# Patient Record
Sex: FEMALE | Race: WHITE | HISPANIC OR LATINO | URBAN - METROPOLITAN AREA
[De-identification: names, ages, dates, MRNs, and addresses within clinical notes are randomized per-mention and may not be internally consistent; named-entity substitution may affect disease eponyms.]

---

## 2017-10-12 ENCOUNTER — OUTPATIENT (OUTPATIENT)
Dept: OUTPATIENT SERVICES | Facility: HOSPITAL | Age: 52
LOS: 1 days | End: 2017-10-12
Payer: COMMERCIAL

## 2017-10-12 ENCOUNTER — APPOINTMENT (OUTPATIENT)
Dept: MRI IMAGING | Facility: CLINIC | Age: 52
End: 2017-10-12
Payer: COMMERCIAL

## 2017-10-12 DIAGNOSIS — Z00.8 ENCOUNTER FOR OTHER GENERAL EXAMINATION: ICD-10-CM

## 2017-10-12 PROCEDURE — 71550 MRI CHEST W/O DYE: CPT

## 2017-10-12 PROCEDURE — 71550 MRI CHEST W/O DYE: CPT | Mod: 26

## 2017-10-25 ENCOUNTER — OUTPATIENT (OUTPATIENT)
Dept: OUTPATIENT SERVICES | Facility: HOSPITAL | Age: 52
LOS: 1 days | End: 2017-10-25
Payer: COMMERCIAL

## 2017-10-25 ENCOUNTER — APPOINTMENT (OUTPATIENT)
Dept: CV DIAGNOSITCS | Facility: HOSPITAL | Age: 52
End: 2017-10-25

## 2017-10-25 PROCEDURE — 93306 TTE W/DOPPLER COMPLETE: CPT | Mod: 26

## 2017-10-25 PROCEDURE — 93306 TTE W/DOPPLER COMPLETE: CPT

## 2017-10-26 ENCOUNTER — RESULT REVIEW (OUTPATIENT)
Age: 52
End: 2017-10-26

## 2017-10-26 DIAGNOSIS — I25.10 ATHEROSCLEROTIC HEART DISEASE OF NATIVE CORONARY ARTERY WITHOUT ANGINA PECTORIS: ICD-10-CM

## 2017-10-27 ENCOUNTER — TRANSCRIPTION ENCOUNTER (OUTPATIENT)
Age: 52
End: 2017-10-27

## 2017-11-08 ENCOUNTER — TRANSCRIPTION ENCOUNTER (OUTPATIENT)
Age: 52
End: 2017-11-08

## 2018-01-06 ENCOUNTER — TRANSCRIPTION ENCOUNTER (OUTPATIENT)
Age: 53
End: 2018-01-06

## 2018-01-25 ENCOUNTER — OUTPATIENT (OUTPATIENT)
Dept: OUTPATIENT SERVICES | Facility: HOSPITAL | Age: 53
LOS: 1 days | End: 2018-01-25
Payer: COMMERCIAL

## 2018-01-25 ENCOUNTER — APPOINTMENT (OUTPATIENT)
Dept: MAMMOGRAPHY | Facility: CLINIC | Age: 53
End: 2018-01-25
Payer: COMMERCIAL

## 2018-01-25 ENCOUNTER — APPOINTMENT (OUTPATIENT)
Dept: ULTRASOUND IMAGING | Facility: CLINIC | Age: 53
End: 2018-01-25
Payer: COMMERCIAL

## 2018-01-25 DIAGNOSIS — Z00.8 ENCOUNTER FOR OTHER GENERAL EXAMINATION: ICD-10-CM

## 2018-01-25 DIAGNOSIS — Z12.31 ENCOUNTER FOR SCREENING MAMMOGRAM FOR MALIGNANT NEOPLASM OF BREAST: ICD-10-CM

## 2018-01-25 DIAGNOSIS — N60.19 DIFFUSE CYSTIC MASTOPATHY OF UNSPECIFIED BREAST: ICD-10-CM

## 2018-01-25 PROCEDURE — 76641 ULTRASOUND BREAST COMPLETE: CPT | Mod: 26,50

## 2018-01-25 PROCEDURE — 77067 SCR MAMMO BI INCL CAD: CPT | Mod: 26

## 2018-01-25 PROCEDURE — 77067 SCR MAMMO BI INCL CAD: CPT

## 2018-01-25 PROCEDURE — 77063 BREAST TOMOSYNTHESIS BI: CPT

## 2018-01-25 PROCEDURE — 77063 BREAST TOMOSYNTHESIS BI: CPT | Mod: 26

## 2018-01-25 PROCEDURE — 76641 ULTRASOUND BREAST COMPLETE: CPT

## 2018-02-05 ENCOUNTER — NON-APPOINTMENT (OUTPATIENT)
Age: 53
End: 2018-02-05

## 2018-02-05 ENCOUNTER — APPOINTMENT (OUTPATIENT)
Dept: CARDIOLOGY | Facility: CLINIC | Age: 53
End: 2018-02-05
Payer: COMMERCIAL

## 2018-02-05 VITALS
DIASTOLIC BLOOD PRESSURE: 81 MMHG | HEART RATE: 72 BPM | SYSTOLIC BLOOD PRESSURE: 134 MMHG | OXYGEN SATURATION: 100 % | HEIGHT: 59 IN | BODY MASS INDEX: 27.42 KG/M2 | WEIGHT: 136 LBS

## 2018-02-05 DIAGNOSIS — Z82.49 FAMILY HISTORY OF ISCHEMIC HEART DISEASE AND OTHER DISEASES OF THE CIRCULATORY SYSTEM: ICD-10-CM

## 2018-02-05 PROCEDURE — 99204 OFFICE O/P NEW MOD 45 MIN: CPT

## 2018-02-05 PROCEDURE — 93000 ELECTROCARDIOGRAM COMPLETE: CPT

## 2018-02-05 RX ORDER — FENOFIBRATE,MICRONIZED 160 MG
160 TABLET ORAL DAILY
Refills: 0 | Status: ACTIVE | COMMUNITY

## 2018-02-05 RX ORDER — AZELASTINE HYDROCHLORIDE 137 UG/1
137 SPRAY, METERED NASAL TWICE DAILY
Refills: 0 | Status: ACTIVE | COMMUNITY

## 2018-02-05 RX ORDER — MOMETASONE FUROATE MONOHYDRATE 50 UG/1
50 SPRAY, METERED NASAL TWICE DAILY
Refills: 0 | Status: ACTIVE | COMMUNITY

## 2018-02-23 ENCOUNTER — TRANSCRIPTION ENCOUNTER (OUTPATIENT)
Age: 53
End: 2018-02-23

## 2018-10-12 ENCOUNTER — APPOINTMENT (OUTPATIENT)
Dept: ORTHOPEDIC SURGERY | Facility: CLINIC | Age: 53
End: 2018-10-12
Payer: COMMERCIAL

## 2018-10-12 VITALS — HEIGHT: 59 IN | SYSTOLIC BLOOD PRESSURE: 138 MMHG | DIASTOLIC BLOOD PRESSURE: 79 MMHG | HEART RATE: 80 BPM

## 2018-10-12 DIAGNOSIS — Z86.39 PERSONAL HISTORY OF OTHER ENDOCRINE, NUTRITIONAL AND METABOLIC DISEASE: ICD-10-CM

## 2018-10-12 DIAGNOSIS — Z78.9 OTHER SPECIFIED HEALTH STATUS: ICD-10-CM

## 2018-10-12 PROCEDURE — 99203 OFFICE O/P NEW LOW 30 MIN: CPT

## 2018-10-12 RX ORDER — OMEPRAZOLE 20 MG/1
TABLET, DELAYED RELEASE ORAL
Refills: 0 | Status: ACTIVE | COMMUNITY

## 2018-11-20 ENCOUNTER — TRANSCRIPTION ENCOUNTER (OUTPATIENT)
Age: 53
End: 2018-11-20

## 2018-12-06 ENCOUNTER — RESULT REVIEW (OUTPATIENT)
Age: 53
End: 2018-12-06

## 2019-01-31 ENCOUNTER — APPOINTMENT (OUTPATIENT)
Dept: ULTRASOUND IMAGING | Facility: CLINIC | Age: 54
End: 2019-01-31
Payer: COMMERCIAL

## 2019-01-31 ENCOUNTER — APPOINTMENT (OUTPATIENT)
Dept: MAMMOGRAPHY | Facility: CLINIC | Age: 54
End: 2019-01-31
Payer: COMMERCIAL

## 2019-01-31 ENCOUNTER — OUTPATIENT (OUTPATIENT)
Dept: OUTPATIENT SERVICES | Facility: HOSPITAL | Age: 54
LOS: 1 days | End: 2019-01-31
Payer: COMMERCIAL

## 2019-01-31 DIAGNOSIS — E83.59 OTHER DISORDERS OF CALCIUM METABOLISM: ICD-10-CM

## 2019-01-31 DIAGNOSIS — Z00.8 ENCOUNTER FOR OTHER GENERAL EXAMINATION: ICD-10-CM

## 2019-01-31 DIAGNOSIS — N60.19 DIFFUSE CYSTIC MASTOPATHY OF UNSPECIFIED BREAST: ICD-10-CM

## 2019-01-31 DIAGNOSIS — Z12.31 ENCOUNTER FOR SCREENING MAMMOGRAM FOR MALIGNANT NEOPLASM OF BREAST: ICD-10-CM

## 2019-01-31 PROCEDURE — 76775 US EXAM ABDO BACK WALL LIM: CPT | Mod: 26

## 2019-01-31 PROCEDURE — 76641 ULTRASOUND BREAST COMPLETE: CPT | Mod: 26,50

## 2019-01-31 PROCEDURE — 77067 SCR MAMMO BI INCL CAD: CPT | Mod: 26

## 2019-01-31 PROCEDURE — 77063 BREAST TOMOSYNTHESIS BI: CPT | Mod: 26

## 2019-01-31 PROCEDURE — 76641 ULTRASOUND BREAST COMPLETE: CPT

## 2019-01-31 PROCEDURE — 77067 SCR MAMMO BI INCL CAD: CPT

## 2019-01-31 PROCEDURE — 76775 US EXAM ABDO BACK WALL LIM: CPT

## 2019-01-31 PROCEDURE — 77063 BREAST TOMOSYNTHESIS BI: CPT

## 2019-02-01 ENCOUNTER — APPOINTMENT (OUTPATIENT)
Dept: ULTRASOUND IMAGING | Facility: CLINIC | Age: 54
End: 2019-02-01
Payer: COMMERCIAL

## 2019-02-01 ENCOUNTER — OUTPATIENT (OUTPATIENT)
Dept: OUTPATIENT SERVICES | Facility: HOSPITAL | Age: 54
LOS: 1 days | End: 2019-02-01
Payer: COMMERCIAL

## 2019-02-01 DIAGNOSIS — R92.2 INCONCLUSIVE MAMMOGRAM: ICD-10-CM

## 2019-02-01 PROCEDURE — 76642 ULTRASOUND BREAST LIMITED: CPT

## 2019-02-01 PROCEDURE — 76642 ULTRASOUND BREAST LIMITED: CPT | Mod: 26,LT

## 2019-02-05 ENCOUNTER — APPOINTMENT (OUTPATIENT)
Dept: ULTRASOUND IMAGING | Facility: CLINIC | Age: 54
End: 2019-02-05

## 2019-02-11 ENCOUNTER — APPOINTMENT (OUTPATIENT)
Dept: ULTRASOUND IMAGING | Facility: CLINIC | Age: 54
End: 2019-02-11

## 2019-02-14 ENCOUNTER — APPOINTMENT (OUTPATIENT)
Dept: MRI IMAGING | Facility: CLINIC | Age: 54
End: 2019-02-14
Payer: COMMERCIAL

## 2019-02-14 ENCOUNTER — OUTPATIENT (OUTPATIENT)
Dept: OUTPATIENT SERVICES | Facility: HOSPITAL | Age: 54
LOS: 1 days | End: 2019-02-14
Payer: COMMERCIAL

## 2019-02-14 DIAGNOSIS — Z00.8 ENCOUNTER FOR OTHER GENERAL EXAMINATION: ICD-10-CM

## 2019-02-14 DIAGNOSIS — R47.9 UNSPECIFIED SPEECH DISTURBANCES: ICD-10-CM

## 2019-02-14 PROCEDURE — 70551 MRI BRAIN STEM W/O DYE: CPT | Mod: 26

## 2019-02-14 PROCEDURE — 70551 MRI BRAIN STEM W/O DYE: CPT

## 2019-05-29 ENCOUNTER — RESULT REVIEW (OUTPATIENT)
Age: 54
End: 2019-05-29

## 2019-09-12 ENCOUNTER — APPOINTMENT (OUTPATIENT)
Dept: ORTHOPEDIC SURGERY | Facility: CLINIC | Age: 54
End: 2019-09-12
Payer: OTHER MISCELLANEOUS

## 2019-09-12 VITALS — BODY MASS INDEX: 27.42 KG/M2 | WEIGHT: 136 LBS | HEIGHT: 59 IN

## 2019-09-12 PROCEDURE — 99203 OFFICE O/P NEW LOW 30 MIN: CPT

## 2019-09-12 PROCEDURE — 73564 X-RAY EXAM KNEE 4 OR MORE: CPT | Mod: RT

## 2019-09-12 NOTE — HISTORY OF PRESENT ILLNESS
[de-identified] : Worker’s Compensation Visit:\par 54 year old female presents for an evaluation of right knee pain hat began on 8/31/2019 when the patient was injured at work while she was hit with a chair on her right knee, noting immediate swelling about the right knee. The swelling has since subsided. She has been utilizing a knee sleeve for added support and stability. Today the patient describes an aching pain located along the anterior and medial aspects of her right knee that is intermittent in nature, as well as pain to palpation medially. Her symptoms are exacerbated with extended periods of walking and weight bearing. The patient has no other complaints at this time.\par \par Test or Referrals: Authorization for physical therapy of the right knee.\par Work Status: The patient is working full-duty, 0% disabled.\par Prognosis: The patient's prognosis for recovery is good.

## 2019-09-12 NOTE — DISCUSSION/SUMMARY
[de-identified] : The underlying pathophysiology was reviewed in great detail with the patient as well as the various treatment options, including ice, analgesics, NSAIDs, Physical therapy, steroid injections. \par \par A prescription for Physical Therapy was provided. A request was sent to workman's compensation. \par \par A home exercise sheet was given and discussed with the patient to follow. \par \par FU 6 weeks.

## 2019-09-12 NOTE — ADDENDUM
[FreeTextEntry1] : I, Inessa Puri, acted solely as a scribe for Dr. Jourdan Nichols on this date 09/12/2019.

## 2019-09-12 NOTE — END OF VISIT
[FreeTextEntry3] : All medical record entries made by the [facility] were at my, Dr. Jourdan Nichols, direction and personally dictated by me on 09/12/2019. I have reviewed the chart and agree that the record accurately reflects my personal performance of the history, physical exam, assessment and plan. I have also personally directed, reviewed, and agreed with the chart.

## 2019-09-12 NOTE — PHYSICAL EXAM
[Normal RLE] : Right Lower Extremity: No scars, rashes, lesions, ulcers, skin intact [Normal LLE] : Left Lower Extremity: No scars, rashes, lesions, ulcers, skin intact [Normal Touch] : sensation intact for touch [Normal] : No swelling, no edema, normal pedal pulses and normal temperature [Poor Appearance] : well-appearing [Acute Distress] : not in acute distress [Obese] : not obese [de-identified] : Right Lower Extremity\par o Knee :\par ¦ Inspection/Palpation : moderate diffuse medial tenderness, no lateral tenderness, no swelling, no deformity\par ¦ Range of Motion : 0 - 110 degrees, no crepitus\par ¦ Stability : no valgus or varus instability present on provocative testing, Lachman’s Test (-)\par ¦ Strength : flexion and extension 4/5\par o Muscle Bulk : normal muscle bulk present\par o Skin : no erythema, no ecchymosis\par o Sensation : sensation to pin intact\par o Vascular Exam : no edema, no cyanosis, dorsalis pedis artery pulse 2+, posterior tibial artery pulse 2+ \par \par Left Lower Extremity\par o Knee :\par ¦ Inspection/Palpation : no tenderness to palpation, no swelling, no deformity\par ¦ Range of Motion : 0 -125 degrees, no crepitus\par ¦ Stability : no valgus or varus instability present on provocative testing, Lachman’s Test (-)\par ¦ Strength : flexion and extension 5/5\par o Muscle Bulk : normal muscle bulk present\par o Skin : no erythema, no ecchymosis\par o Sensation : sensation to pin intact\par o Vascular Exam : no edema, no cyanosis, dorsalis pedis artery pulse 2+, posterior tibial artery pulse 2+  [de-identified] : \par o Right Knee : AP, lateral, Burroughs and sunrise views of the knee were obtained, there are no soft tissue abnormalities, alignment is normal, no fractures, normal appearing joint spaces, normal bone density, no bony lesions\par \par

## 2019-09-12 NOTE — REASON FOR VISIT
[Initial Visit] : an initial visit for [Worker's Compensation] : This visit is related to worker's compensation [Knee Pain] : knee pain

## 2019-09-20 ENCOUNTER — RX RENEWAL (OUTPATIENT)
Age: 54
End: 2019-09-20

## 2019-10-12 ENCOUNTER — OUTPATIENT (OUTPATIENT)
Dept: OUTPATIENT SERVICES | Facility: HOSPITAL | Age: 54
LOS: 1 days | End: 2019-10-12
Payer: COMMERCIAL

## 2019-10-12 ENCOUNTER — APPOINTMENT (OUTPATIENT)
Dept: RADIOLOGY | Facility: HOSPITAL | Age: 54
End: 2019-10-12
Payer: COMMERCIAL

## 2019-10-12 DIAGNOSIS — Z00.8 ENCOUNTER FOR OTHER GENERAL EXAMINATION: ICD-10-CM

## 2019-10-12 PROCEDURE — 71046 X-RAY EXAM CHEST 2 VIEWS: CPT

## 2019-10-12 PROCEDURE — 71046 X-RAY EXAM CHEST 2 VIEWS: CPT | Mod: 26

## 2019-10-14 ENCOUNTER — OUTPATIENT (OUTPATIENT)
Dept: OUTPATIENT SERVICES | Facility: HOSPITAL | Age: 54
LOS: 1 days | End: 2019-10-14
Payer: COMMERCIAL

## 2019-10-14 DIAGNOSIS — Z00.00 ENCOUNTER FOR GENERAL ADULT MEDICAL EXAMINATION WITHOUT ABNORMAL FINDINGS: ICD-10-CM

## 2019-10-14 PROCEDURE — 93010 ELECTROCARDIOGRAM REPORT: CPT

## 2019-10-14 PROCEDURE — 93005 ELECTROCARDIOGRAM TRACING: CPT

## 2019-10-24 ENCOUNTER — APPOINTMENT (OUTPATIENT)
Dept: ORTHOPEDIC SURGERY | Facility: CLINIC | Age: 54
End: 2019-10-24
Payer: OTHER MISCELLANEOUS

## 2019-10-24 VITALS — HEIGHT: 59 IN | BODY MASS INDEX: 27.42 KG/M2 | WEIGHT: 136 LBS

## 2019-10-24 PROCEDURE — 99214 OFFICE O/P EST MOD 30 MIN: CPT

## 2019-10-24 NOTE — END OF VISIT
[FreeTextEntry3] : All medical record entries made by the Scribe were at my, Dr. Jourdan Nichols, direction and personally dictated by me on 10/24/2019. I have reviewed the chart and agree that the record accurately reflects my personal performance of the history, physical exam, assessment and plan. I have also personally directed, reviewed, and agreed with the chart.

## 2019-10-24 NOTE — HISTORY OF PRESENT ILLNESS
[de-identified] : Worker’s Compensation Visit:\par 54 year old female presents for an evaluation of right knee pain hat began on 8/31/2019 when the patient was injured at work while she was hit with a chair on her right knee, noting immediate swelling about the right knee. The swelling has since subsided. She has been utilizing a knee sleeve for added support and stability. Today the patient describes an aching pain located along the anterior and medial aspects of her right knee that is intermittent in nature, as well as pain to palpation medially. Her symptoms are exacerbated with extended periods of walking and weight bearing.\par \par Since her last visit: The patient has not been attending physical therapy but has been performing a home exercise program, noting improvements in strength and ROM. She reports that her pain has improved since her last visit, but she continues to experience moderate pain about her right knee, as well as occasional soreness along the medial aspect of her right knee and upper calf. Her symptoms are exacerbated with extended periods of walking, twisting motions, and with deep bending. \par \par Test or Referrals: Authorization for an MRI of the right knee. \par Work Status: The patient is working full-duty, 0% disabled.\par Prognosis: The patient's prognosis for recovery is good.

## 2019-10-24 NOTE — ADDENDUM
[FreeTextEntry1] : I, Inessa Puri, acted solely as a scribe for Dr. Jourdan Nichols on this date 10/24/2019.

## 2019-10-24 NOTE — DISCUSSION/SUMMARY
[de-identified] : The underlying pathophysiology was reviewed in great detail with the patient as well as the various treatment options, including ice, analgesics, NSAIDs, Physical therapy, steroid injections. \par \par An MRI of the right knee was ordered to rule out meniscal tear. A request was sent to workman's compensation. \par \par FU after imaging is obtained.

## 2019-10-24 NOTE — PHYSICAL EXAM
[Normal RLE] : Right Lower Extremity: No scars, rashes, lesions, ulcers, skin intact [Normal LLE] : Left Lower Extremity: No scars, rashes, lesions, ulcers, skin intact [Normal Touch] : sensation intact for touch [Normal] : No swelling, no edema, normal pedal pulses and normal temperature [Poor Appearance] : well-appearing [Acute Distress] : not in acute distress [Obese] : not obese [de-identified] : Right Lower Extremity\par o Knee :\par ¦ Inspection/Palpation : medial joint line tenderness, tenderness over the medial tibial plateau, no swelling, no deformity\par ¦ Range of Motion : 0 - 130 degrees, no crepitus\par ¦ Stability : no valgus or varus instability present on provocative testing, Lachman’s Test (-)\par ¦ Strength : flexion and extension 5/5\par ¦ Tests and Signs: Paul’s Test (+) with medial pain\par o Muscle Bulk : normal muscle bulk present\par o Skin : no erythema, no ecchymosis\par o Sensation : sensation to pin intact\par o Vascular Exam : no edema, no cyanosis, dorsalis pedis artery pulse 2+, posterior tibial artery pulse 2+ \par \par Left Lower Extremity\par o Knee :\par ¦ Inspection/Palpation : no tenderness to palpation, no swelling, no deformity\par ¦ Range of Motion : 0 -125 degrees, no crepitus\par ¦ Stability : no valgus or varus instability present on provocative testing, Lachman’s Test (-)\par ¦ Strength : flexion and extension 5/5\par o Muscle Bulk : normal muscle bulk present\par o Skin : no erythema, no ecchymosis\par o Sensation : sensation to pin intact\par o Vascular Exam : no edema, no cyanosis, dorsalis pedis artery pulse 2+, posterior tibial artery pulse 2+

## 2019-12-02 ENCOUNTER — FORM ENCOUNTER (OUTPATIENT)
Age: 54
End: 2019-12-02

## 2019-12-03 ENCOUNTER — OUTPATIENT (OUTPATIENT)
Dept: OUTPATIENT SERVICES | Facility: HOSPITAL | Age: 54
LOS: 1 days | End: 2019-12-03
Payer: COMMERCIAL

## 2019-12-03 ENCOUNTER — APPOINTMENT (OUTPATIENT)
Dept: MRI IMAGING | Facility: HOSPITAL | Age: 54
End: 2019-12-03
Payer: OTHER MISCELLANEOUS

## 2019-12-03 DIAGNOSIS — Z00.8 ENCOUNTER FOR OTHER GENERAL EXAMINATION: ICD-10-CM

## 2019-12-03 PROCEDURE — 73721 MRI JNT OF LWR EXTRE W/O DYE: CPT | Mod: 26,RT

## 2019-12-03 PROCEDURE — 73721 MRI JNT OF LWR EXTRE W/O DYE: CPT

## 2019-12-04 ENCOUNTER — CLINICAL ADVICE (OUTPATIENT)
Age: 54
End: 2019-12-04

## 2019-12-05 ENCOUNTER — APPOINTMENT (OUTPATIENT)
Dept: ORTHOPEDIC SURGERY | Facility: CLINIC | Age: 54
End: 2019-12-05
Payer: OTHER MISCELLANEOUS

## 2019-12-05 VITALS — WEIGHT: 136 LBS | HEIGHT: 59 IN | BODY MASS INDEX: 27.42 KG/M2

## 2019-12-05 PROCEDURE — 99214 OFFICE O/P EST MOD 30 MIN: CPT

## 2019-12-05 NOTE — ADDENDUM
[FreeTextEntry1] : I, Inessa Puri, acted solely as a scribe for Dr. Jourdan Nichols on this date 12/05/2019.

## 2019-12-05 NOTE — DISCUSSION/SUMMARY
[de-identified] : The underlying pathophysiology was reviewed in great detail with the patient as well as the various treatment options, including ice, analgesics, NSAIDs, Physical therapy, steroid injections, right arthroscopic medial meniscectomy. \par \par Prescriptions were provided for Physical Therapy and for Diclofenac gel. A request was sent to workman's compensation. \par \par FU 6 weeks.

## 2019-12-05 NOTE — END OF VISIT
[FreeTextEntry3] : All medical record entries made by the [facility] were at my, Dr. Jourdan Nichols, direction and personally dictated by me on 12/05/2019. I have reviewed the chart and agree that the record accurately reflects my personal performance of the history, physical exam, assessment and plan. I have also personally directed, reviewed, and agreed with the chart.

## 2019-12-05 NOTE — HISTORY OF PRESENT ILLNESS
[de-identified] : Worker’s Compensation Visit:\par 54 year old female presents for an evaluation of right knee pain that began on 8/31/2019 when the patient was injured at work while she was hit with a chair on her right knee, noting immediate swelling about the right knee. The swelling has since subsided. She has been utilizing a knee sleeve for added support and stability. Today the patient describes an aching pain located along the anterior and medial aspects of her right knee that is intermittent in nature, as well as pain to palpation medially. Her symptoms are exacerbated with extended periods of walking and weight bearing.\par \par Since her last visit: The patient reports that her pain has worsened since her last visit, and she has been experiencing an aching pain along the medial aspect of her right knee. Her symptoms are exacerbated with walking, twisting motions, climbing stairs, and with lying on her right side. She notes that her pain is also worse in the morning. Today she presents for an MRI review of her right knee. \par \par Test or Referrals: Authorization for physical therapy of the right knee. \par Work Status: The patient is working full-duty, 0% disabled.\par Prognosis: The patient's prognosis for recovery is good.

## 2019-12-05 NOTE — PHYSICAL EXAM
[Normal RLE] : Right Lower Extremity: No scars, rashes, lesions, ulcers, skin intact [Normal Touch] : sensation intact for touch [Normal] : No swelling, no edema, normal pedal pulses and normal temperature [Poor Appearance] : well-appearing [Acute Distress] : not in acute distress [Obese] : not obese [de-identified] : Right Lower Extremity\par o Knee :\par ¦ Inspection/Palpation : tenderness over the posteromedial joint line, no swelling, no deformity\par ¦ Range of Motion : 0 - 125 degrees, no crepitus\par ¦ Stability : no valgus or varus instability present on provocative testing, Lachman’s Test (-)\par ¦ Strength : flexion and extension 5/5\par ¦ Tests and Signs: Paul’s Test (+) with marked medial pain\par o Muscle Bulk : normal muscle bulk present\par o Skin : no erythema, no ecchymosis\par o Sensation : sensation to pin intact\par o Vascular Exam : no edema, no cyanosis, dorsalis pedis artery pulse 2+, posterior tibial artery pulse 2+  [de-identified] : o An MRI of the right knee was obtained at [facility] at [place] on 12/3/2019, revealed:\par 1. Non-displaced medial meniscal tear. \par 2. Low-grade focal chondral loss of the medial femoral condyle.

## 2019-12-08 ENCOUNTER — RX RENEWAL (OUTPATIENT)
Age: 54
End: 2019-12-08

## 2019-12-12 ENCOUNTER — RESULT REVIEW (OUTPATIENT)
Age: 54
End: 2019-12-12

## 2020-01-30 ENCOUNTER — APPOINTMENT (OUTPATIENT)
Dept: ORTHOPEDIC SURGERY | Facility: CLINIC | Age: 55
End: 2020-01-30
Payer: OTHER MISCELLANEOUS

## 2020-01-30 VITALS — HEIGHT: 59 IN | BODY MASS INDEX: 27.42 KG/M2 | WEIGHT: 136 LBS

## 2020-01-30 DIAGNOSIS — S89.91XD UNSPECIFIED INJURY OF RIGHT LOWER LEG, SUBSEQUENT ENCOUNTER: ICD-10-CM

## 2020-01-30 PROCEDURE — 99213 OFFICE O/P EST LOW 20 MIN: CPT

## 2020-01-30 NOTE — END OF VISIT
[FreeTextEntry3] : All medical record entries made by the [facility] were at my, Dr. Jourdan Nichols, direction and personally dictated by me on 01/30/2020. I have reviewed the chart and agree that the record accurately reflects my personal performance of the history, physical exam, assessment and plan. I have also personally directed, reviewed, and agreed with the chart.

## 2020-01-30 NOTE — ADDENDUM
[FreeTextEntry1] : I, Inessa Puri, acted solely as a scribe for Dr. Jourdan Nichols on this date 01/30/2020.

## 2020-01-30 NOTE — DISCUSSION/SUMMARY
[de-identified] : The underlying pathophysiology was reviewed in great detail with the patient as well as the various treatment options, including ice, analgesics, NSAIDs, Physical therapy, steroid injections, right arthroscopic medial meniscectomy. \par \par She is to continue with Physical Therapy. A request was sent to workman's compensation. \par \par FU 6 weeks.

## 2020-01-30 NOTE — HISTORY OF PRESENT ILLNESS
[de-identified] : Worker’s Compensation Visit:\par 54 year old female presents for an evaluation of right knee pain that began on 8/31/2019 when the patient was injured at work while she was hit with a chair on her right knee, noting immediate swelling about the right knee. The swelling has since subsided. She has been utilizing a knee sleeve for added support and stability. Today the patient describes an aching pain located along the anterior and medial aspects of her right knee that is intermittent in nature, as well as pain to palpation medially. Her symptoms are exacerbated with extended periods of walking and weight bearing.\par On 12/3/2019, an MRI of the right knee was obtained which revealed non-displaced medial meniscal tear, as well as low-grade focal chondral loss of the medial femoral condyle.\par \par Since her last visit: The patient has been attending physical therapy and notes improvements in strength and ROM. She reports that her pain has improved since her last visit and she describes moderate aching pain along the medial aspect of her right knee that is intermittent in nature. Her symptoms are exacerbated with certain twisting/pivoting motions, kneeling, and with deep knee flexion, though she is able to walk painlessly. The patient has no other complaints at this time. \par \par Test or Referrals: Authorization for physical therapy of the right knee. \par Work Status: The patient is working full-duty, 0% disabled.\par Prognosis: The patient's prognosis for recovery is good.

## 2020-01-30 NOTE — PHYSICAL EXAM
[Normal RLE] : Right Lower Extremity: No scars, rashes, lesions, ulcers, skin intact [Normal] : No swelling, no edema, normal pedal pulses and normal temperature [Normal Touch] : sensation intact for touch [Poor Appearance] : well-appearing [Acute Distress] : not in acute distress [Obese] : not obese [de-identified] : Right Lower Extremity\par o Knee :\par ¦ Inspection/Palpation : medial joint line tenderness, no swelling, no deformity\par ¦ Range of Motion : 0 - 125 degrees, no crepitus\par ¦ Stability : no valgus or varus instability present on provocative testing, Lachman’s Test (-)\par ¦ Strength : flexion and extension 5/5\par ¦ Tests and Signs: Paul’s Test (-) \par o Muscle Bulk : normal muscle bulk present\par o Skin : no erythema, no ecchymosis\par o Sensation : sensation to pin intact\par o Vascular Exam : no edema, no cyanosis, dorsalis pedis artery pulse 2+, posterior tibial artery pulse 2+

## 2020-02-03 ENCOUNTER — OUTPATIENT (OUTPATIENT)
Dept: OUTPATIENT SERVICES | Facility: HOSPITAL | Age: 55
LOS: 1 days | End: 2020-02-03
Payer: COMMERCIAL

## 2020-02-03 ENCOUNTER — APPOINTMENT (OUTPATIENT)
Dept: RADIOLOGY | Facility: HOSPITAL | Age: 55
End: 2020-02-03
Payer: COMMERCIAL

## 2020-02-03 DIAGNOSIS — Z00.8 ENCOUNTER FOR OTHER GENERAL EXAMINATION: ICD-10-CM

## 2020-02-03 PROCEDURE — 73030 X-RAY EXAM OF SHOULDER: CPT | Mod: 26,RT

## 2020-02-03 PROCEDURE — 73030 X-RAY EXAM OF SHOULDER: CPT

## 2020-02-06 ENCOUNTER — APPOINTMENT (OUTPATIENT)
Dept: MAMMOGRAPHY | Facility: CLINIC | Age: 55
End: 2020-02-06
Payer: COMMERCIAL

## 2020-02-06 ENCOUNTER — OUTPATIENT (OUTPATIENT)
Dept: OUTPATIENT SERVICES | Facility: HOSPITAL | Age: 55
LOS: 1 days | End: 2020-02-06
Payer: COMMERCIAL

## 2020-02-06 ENCOUNTER — APPOINTMENT (OUTPATIENT)
Dept: RADIOLOGY | Facility: CLINIC | Age: 55
End: 2020-02-06
Payer: COMMERCIAL

## 2020-02-06 ENCOUNTER — APPOINTMENT (OUTPATIENT)
Dept: ULTRASOUND IMAGING | Facility: CLINIC | Age: 55
End: 2020-02-06
Payer: COMMERCIAL

## 2020-02-06 DIAGNOSIS — Z00.8 ENCOUNTER FOR OTHER GENERAL EXAMINATION: ICD-10-CM

## 2020-02-06 PROCEDURE — 77063 BREAST TOMOSYNTHESIS BI: CPT

## 2020-02-06 PROCEDURE — 77063 BREAST TOMOSYNTHESIS BI: CPT | Mod: 26

## 2020-02-06 PROCEDURE — 76641 ULTRASOUND BREAST COMPLETE: CPT | Mod: 26,50

## 2020-02-06 PROCEDURE — 77080 DXA BONE DENSITY AXIAL: CPT | Mod: 26

## 2020-02-06 PROCEDURE — 77080 DXA BONE DENSITY AXIAL: CPT

## 2020-02-06 PROCEDURE — 77067 SCR MAMMO BI INCL CAD: CPT

## 2020-02-06 PROCEDURE — 77067 SCR MAMMO BI INCL CAD: CPT | Mod: 26

## 2020-02-06 PROCEDURE — 76641 ULTRASOUND BREAST COMPLETE: CPT

## 2020-03-12 ENCOUNTER — APPOINTMENT (OUTPATIENT)
Dept: ORTHOPEDIC SURGERY | Facility: CLINIC | Age: 55
End: 2020-03-12
Payer: OTHER MISCELLANEOUS

## 2020-03-12 VITALS — WEIGHT: 136 LBS | HEIGHT: 59 IN | BODY MASS INDEX: 27.42 KG/M2

## 2020-03-12 PROCEDURE — 99213 OFFICE O/P EST LOW 20 MIN: CPT

## 2020-03-12 NOTE — ADDENDUM
[FreeTextEntry1] : I, Inessa Puri, acted solely as a scribe for Dr. Jourdan Nichols on this date 03/12/2020.

## 2020-03-12 NOTE — HISTORY OF PRESENT ILLNESS
[de-identified] : Worker’s Compensation Visit:\par 54 year old female presents for an evaluation of right knee pain that began on 8/31/2019 when the patient was injured at work while she was hit with a chair on her right knee, noting immediate swelling about the right knee. The swelling has since subsided. She has been utilizing a knee sleeve for added support and stability. Today the patient describes an aching pain located along the anterior and medial aspects of her right knee that is intermittent in nature, as well as pain to palpation medially. Her symptoms are exacerbated with extended periods of walking and weight bearing.\par On 12/3/2019, an MRI of the right knee was obtained which revealed non-displaced medial meniscal tear, as well as low-grade focal chondral loss of the medial femoral condyle.\par \par Since her last visit: The patient has been attending physical therapy and notes improvements in strength and ROM. She reports that her pain has continued to improve since her last visit and she describes only mild pain along the medial aspect of her right knee that is intermittent in nature. Her symptoms are exacerbated with certain twisting/pivoting motions. The patient has no other complaints at this time. \par \par Test or Referrals: Authorization for physical therapy of the right knee. \par Work Status: The patient is working full-duty, 0% disabled.\par Prognosis: The patient's prognosis for recovery is good.

## 2020-03-12 NOTE — DISCUSSION/SUMMARY
[de-identified] : The underlying pathophysiology was reviewed in great detail with the patient as well as the various treatment options, including ice, analgesics, NSAIDs, Physical therapy, steroid injections, right arthroscopic medial meniscectomy. \par \par She is to continue with Physical Therapy, a prescription was provided. A request was sent to workman's compensation. \par \par FU 6 weeks.

## 2020-03-12 NOTE — PHYSICAL EXAM
[Normal RLE] : Right Lower Extremity: No scars, rashes, lesions, ulcers, skin intact [Normal Touch] : sensation intact for touch [Normal] : No swelling, no edema, normal pedal pulses and normal temperature [Poor Appearance] : well-appearing [Acute Distress] : not in acute distress [Obese] : not obese [de-identified] : Right Lower Extremity\par o Knee :\par ¦ Inspection/Palpation : tenderness over the medial epicondyle, no medial joint line tenderness, no swelling, no deformity\par ¦ Range of Motion : 0 - 130 degrees, no crepitus\par ¦ Stability : no valgus or varus instability present on provocative testing, Lachman’s Test (-)\par ¦ Strength : flexion and extension 5/5\par ¦ Tests and Signs: Paul’s Test (-) \par o Muscle Bulk : normal muscle bulk present\par o Skin : no erythema, no ecchymosis\par o Sensation : sensation to pin intact\par o Vascular Exam : no edema, no cyanosis, dorsalis pedis artery pulse 2+, posterior tibial artery pulse 2+

## 2020-03-12 NOTE — END OF VISIT
[FreeTextEntry3] : All medical record entries made by the [facility] were at my, Dr. Jourdan Nichols, direction and personally dictated by me on 03/12/2020. I have reviewed the chart and agree that the record accurately reflects my personal performance of the history, physical exam, assessment and plan. I have also personally directed, reviewed, and agreed with the chart.

## 2020-04-23 ENCOUNTER — APPOINTMENT (OUTPATIENT)
Dept: ORTHOPEDIC SURGERY | Facility: CLINIC | Age: 55
End: 2020-04-23

## 2020-04-24 ENCOUNTER — APPOINTMENT (OUTPATIENT)
Dept: ORTHOPEDIC SURGERY | Facility: CLINIC | Age: 55
End: 2020-04-24
Payer: OTHER MISCELLANEOUS

## 2020-04-24 PROCEDURE — 99213 OFFICE O/P EST LOW 20 MIN: CPT | Mod: 95

## 2020-04-24 NOTE — HISTORY OF PRESENT ILLNESS
[Home] : at home, [unfilled] , at the time of the visit. [Other Location: e.g. Home (Enter Location, City,State)___] : at [unfilled] [Patient] : the patient [Self] : self [de-identified] : Worker’s Compensation Visit:\par 54 year old female presents for an evaluation of right knee pain that began on 8/31/2019 when the patient was injured at work while she was hit with a chair on her right knee, noting immediate swelling about the right knee. The swelling has since subsided. She has been utilizing a knee sleeve for added support and stability. Today the patient describes an aching pain located along the anterior and medial aspects of her right knee that is intermittent in nature, as well as pain to palpation medially. Her symptoms are exacerbated with extended periods of walking and weight bearing.\par On 12/3/2019, an MRI of the right knee was obtained which revealed non-displaced medial meniscal tear, as well as low-grade focal chondral loss of the medial femoral condyle.\par \par Since her last visit: The patient had been attending physical therapy and noted improvements in strength and ROM. She reports that her pain has continued to improve since her last visit and she describes only mild pain along the medial aspect of her right knee that is intermittent in nature. Her symptoms are exacerbated with certain twisting/pivoting motions. She has been working full time now and reports that the knee has been bothering her more. The patient has no other complaints at this time. \par \par Test or Referrals: Authorization for physical therapy of the right knee. \par Work Status: The patient is working full-duty, 0% disabled.\par Prognosis: The patient's prognosis for recovery is good.

## 2020-04-24 NOTE — PHYSICAL EXAM
[Normal Touch] : sensation intact for touch [Normal RLE] : Right Lower Extremity: No scars, rashes, lesions, ulcers, skin intact [Normal] : No swelling, no edema, normal pedal pulses and normal temperature [Acute Distress] : not in acute distress [Poor Appearance] : well-appearing [de-identified] : Well developed well nourished patient in no acute distress. \par Alert and oriented x 3.\par Normal mood and affect. \par Skin without rashes. \par Breathing non-labored. \par Normal balance, normal gait\par \par Right Lower Extremity\par o Knee :\par ¦ Inspection/Palpation : patient localizes tenderness over the medial knee, mild swelling, no deformity\par ¦ Range of Motion : 0 - 130 degrees, no crepitus\par o Muscle Bulk : normal muscle bulk present\par o Skin : no erythema, no ecchymosis\par o Sensation : sensation intact\par o Vascular Exam : no edema, no cyanosis [Obese] : not obese

## 2020-04-24 NOTE — DISCUSSION/SUMMARY
[de-identified] : The underlying pathophysiology was reviewed in great detail with the patient as well as the various treatment options, including ice, analgesics, NSAIDs, Physical therapy, steroid injections, right arthroscopic medial meniscectomy. \par \par She is to continue with Physical Therapy, a prescription was provided. A request was sent to workman's compensation. \par \par FU 6 weeks.

## 2020-04-26 ENCOUNTER — MESSAGE (OUTPATIENT)
Age: 55
End: 2020-04-26

## 2020-05-09 LAB
SARS-COV-2 IGG SERPL IA-ACNC: 0.1 INDEX
SARS-COV-2 IGG SERPL QL IA: NEGATIVE

## 2020-06-09 NOTE — REASON FOR VISIT
[Worker's Compensation] : This visit is related to worker's compensation [Follow-Up Visit] : a follow-up visit for [Knee Pain] : knee pain

## 2020-06-11 ENCOUNTER — APPOINTMENT (OUTPATIENT)
Dept: ORTHOPEDIC SURGERY | Facility: CLINIC | Age: 55
End: 2020-06-11
Payer: OTHER MISCELLANEOUS

## 2020-06-11 VITALS — BODY MASS INDEX: 27.42 KG/M2 | WEIGHT: 136 LBS | HEIGHT: 59 IN

## 2020-06-11 DIAGNOSIS — S83.241A OTHER TEAR OF MEDIAL MENISCUS, CURRENT INJURY, RIGHT KNEE, INITIAL ENCOUNTER: ICD-10-CM

## 2020-06-11 PROCEDURE — 99213 OFFICE O/P EST LOW 20 MIN: CPT

## 2020-06-11 NOTE — HISTORY OF PRESENT ILLNESS
[de-identified] : Worker’s Compensation Visit:\par 54 year old female presents for an evaluation of right knee pain that began on 8/31/2019 when the patient was injured at work while she was hit with a chair on her right knee, noting immediate swelling about the right knee. The swelling has since subsided. She has been utilizing a knee sleeve for added support and stability. Today the patient describes an aching pain located along the anterior and medial aspects of her right knee that is intermittent in nature, as well as pain to palpation medially. Her symptoms are exacerbated with extended periods of walking and weight bearing.\par On 12/3/2019, an MRI of the right knee was obtained which revealed non-displaced medial meniscal tear, as well as low-grade focal chondral loss of the medial femoral condyle.\par \par Since her last visit: The patient has not been attending physical therapy but has been performing a home exercise program and notes improvements in strength and ROM. She reports that her pain has continued to improve since her last visit and she describes only mild pain along the medial aspect of her right knee that is intermittent in nature. Her symptoms are exacerbated with certain twisting/pivoting motions. She has been working full time now and reports that the knee has been bothering her more. The patient has no other complaints at this time. Of note, patient is in the process of scheduling an appointment with a workers compensation physician for evaluation. \par \par Test or Referrals: No authorizations requested at this time. \par Work Status: The patient is working full-duty, 0% disabled.\par Prognosis: The patient's prognosis for recovery is good.

## 2020-06-11 NOTE — DISCUSSION/SUMMARY
[de-identified] : The underlying pathophysiology was reviewed in great detail with the patient as well as the various treatment options, including ice, analgesics, NSAIDs, Physical therapy, steroid injections, right arthroscopic medial meniscectomy. \par \par Continue with home exercise program. \par \par Activity modifications and restrictions were discussed. I advised avoiding deep bending, squatting and high intensity activity. \par \par FU 6 weeks.

## 2020-06-11 NOTE — PHYSICAL EXAM
[Normal RLE] : Right Lower Extremity: No scars, rashes, lesions, ulcers, skin intact [Normal Touch] : sensation intact for touch [Normal] : No swelling, no edema, normal pedal pulses and normal temperature [Poor Appearance] : well-appearing [Acute Distress] : not in acute distress [Obese] : not obese [de-identified] : Right Lower Extremity\par o Knee :\par ¦ Inspection/Palpation : patient localizes tenderness over the medial knee, no swelling, no deformity\par ¦ Range of Motion : 0 - 130 degrees, no crepitus\par ¦ Strength : flexion and extension 5/5\par o Muscle Bulk : normal muscle bulk present\par o Skin : no erythema, no ecchymosis\par o Sensation : sensation intact\par o Vascular Exam : no edema, no cyanosis\par o Special Tests: Paul’s Test (+ mild pain)

## 2020-09-24 ENCOUNTER — APPOINTMENT (OUTPATIENT)
Dept: ORTHOPEDIC SURGERY | Facility: CLINIC | Age: 55
End: 2020-09-24

## 2020-10-01 ENCOUNTER — OUTPATIENT (OUTPATIENT)
Dept: OUTPATIENT SERVICES | Facility: HOSPITAL | Age: 55
LOS: 1 days | End: 2020-10-01
Payer: COMMERCIAL

## 2020-10-01 ENCOUNTER — APPOINTMENT (OUTPATIENT)
Dept: CT IMAGING | Facility: CLINIC | Age: 55
End: 2020-10-01
Payer: COMMERCIAL

## 2020-10-01 DIAGNOSIS — Z12.31 ENCOUNTER FOR SCREENING MAMMOGRAM FOR MALIGNANT NEOPLASM OF BREAST: ICD-10-CM

## 2020-10-01 PROCEDURE — 74176 CT ABD & PELVIS W/O CONTRAST: CPT | Mod: 26

## 2020-10-01 PROCEDURE — 74176 CT ABD & PELVIS W/O CONTRAST: CPT

## 2020-10-29 ENCOUNTER — APPOINTMENT (OUTPATIENT)
Dept: ULTRASOUND IMAGING | Facility: CLINIC | Age: 55
End: 2020-10-29
Payer: COMMERCIAL

## 2020-10-29 ENCOUNTER — OUTPATIENT (OUTPATIENT)
Dept: OUTPATIENT SERVICES | Facility: HOSPITAL | Age: 55
LOS: 1 days | End: 2020-10-29
Payer: COMMERCIAL

## 2020-10-29 DIAGNOSIS — Z00.8 ENCOUNTER FOR OTHER GENERAL EXAMINATION: ICD-10-CM

## 2020-10-29 PROCEDURE — 76770 US EXAM ABDO BACK WALL COMP: CPT | Mod: 26

## 2020-10-29 PROCEDURE — 76770 US EXAM ABDO BACK WALL COMP: CPT

## 2020-11-14 ENCOUNTER — APPOINTMENT (OUTPATIENT)
Dept: MRI IMAGING | Facility: CLINIC | Age: 55
End: 2020-11-14
Payer: COMMERCIAL

## 2020-11-14 ENCOUNTER — OUTPATIENT (OUTPATIENT)
Dept: OUTPATIENT SERVICES | Facility: HOSPITAL | Age: 55
LOS: 1 days | End: 2020-11-14
Payer: COMMERCIAL

## 2020-11-14 DIAGNOSIS — Z00.8 ENCOUNTER FOR OTHER GENERAL EXAMINATION: ICD-10-CM

## 2020-11-14 PROCEDURE — 72148 MRI LUMBAR SPINE W/O DYE: CPT | Mod: 26

## 2020-11-14 PROCEDURE — 72148 MRI LUMBAR SPINE W/O DYE: CPT

## 2020-12-03 ENCOUNTER — RESULT REVIEW (OUTPATIENT)
Age: 55
End: 2020-12-03

## 2021-02-17 ENCOUNTER — OUTPATIENT (OUTPATIENT)
Dept: OUTPATIENT SERVICES | Facility: HOSPITAL | Age: 56
LOS: 1 days | End: 2021-02-17
Payer: COMMERCIAL

## 2021-02-17 ENCOUNTER — APPOINTMENT (OUTPATIENT)
Dept: ULTRASOUND IMAGING | Facility: HOSPITAL | Age: 56
End: 2021-02-17
Payer: COMMERCIAL

## 2021-02-17 DIAGNOSIS — E21.3 HYPERPARATHYROIDISM, UNSPECIFIED: ICD-10-CM

## 2021-02-17 PROCEDURE — 76536 US EXAM OF HEAD AND NECK: CPT | Mod: 26

## 2021-02-17 PROCEDURE — 76536 US EXAM OF HEAD AND NECK: CPT

## 2021-02-18 ENCOUNTER — APPOINTMENT (OUTPATIENT)
Dept: ULTRASOUND IMAGING | Facility: CLINIC | Age: 56
End: 2021-02-18

## 2021-02-18 ENCOUNTER — APPOINTMENT (OUTPATIENT)
Dept: MAMMOGRAPHY | Facility: CLINIC | Age: 56
End: 2021-02-18

## 2021-03-08 ENCOUNTER — APPOINTMENT (OUTPATIENT)
Dept: RADIOLOGY | Facility: HOSPITAL | Age: 56
End: 2021-03-08
Payer: COMMERCIAL

## 2021-03-08 ENCOUNTER — TRANSCRIPTION ENCOUNTER (OUTPATIENT)
Age: 56
End: 2021-03-08

## 2021-03-08 ENCOUNTER — OUTPATIENT (OUTPATIENT)
Dept: OUTPATIENT SERVICES | Facility: HOSPITAL | Age: 56
LOS: 1 days | End: 2021-03-08
Payer: COMMERCIAL

## 2021-03-08 DIAGNOSIS — M25.531 PAIN IN RIGHT WRIST: ICD-10-CM

## 2021-03-08 PROCEDURE — 73110 X-RAY EXAM OF WRIST: CPT

## 2021-03-08 PROCEDURE — 73110 X-RAY EXAM OF WRIST: CPT | Mod: 26,RT

## 2021-03-25 ENCOUNTER — APPOINTMENT (OUTPATIENT)
Dept: ULTRASOUND IMAGING | Facility: CLINIC | Age: 56
End: 2021-03-25
Payer: COMMERCIAL

## 2021-03-25 ENCOUNTER — APPOINTMENT (OUTPATIENT)
Dept: MAMMOGRAPHY | Facility: CLINIC | Age: 56
End: 2021-03-25
Payer: COMMERCIAL

## 2021-03-25 ENCOUNTER — OUTPATIENT (OUTPATIENT)
Dept: OUTPATIENT SERVICES | Facility: HOSPITAL | Age: 56
LOS: 1 days | End: 2021-03-25
Payer: COMMERCIAL

## 2021-03-25 DIAGNOSIS — Z00.8 ENCOUNTER FOR OTHER GENERAL EXAMINATION: ICD-10-CM

## 2021-03-25 PROCEDURE — 77067 SCR MAMMO BI INCL CAD: CPT

## 2021-03-25 PROCEDURE — 77063 BREAST TOMOSYNTHESIS BI: CPT | Mod: 26

## 2021-03-25 PROCEDURE — 76641 ULTRASOUND BREAST COMPLETE: CPT

## 2021-03-25 PROCEDURE — 76641 ULTRASOUND BREAST COMPLETE: CPT | Mod: 26,50

## 2021-03-25 PROCEDURE — 77067 SCR MAMMO BI INCL CAD: CPT | Mod: 26

## 2021-03-25 PROCEDURE — 77063 BREAST TOMOSYNTHESIS BI: CPT

## 2022-02-03 ENCOUNTER — APPOINTMENT (OUTPATIENT)
Dept: ULTRASOUND IMAGING | Facility: CLINIC | Age: 57
End: 2022-02-03
Payer: COMMERCIAL

## 2022-02-03 ENCOUNTER — OUTPATIENT (OUTPATIENT)
Dept: OUTPATIENT SERVICES | Facility: HOSPITAL | Age: 57
LOS: 1 days | End: 2022-02-03
Payer: COMMERCIAL

## 2022-02-03 DIAGNOSIS — Z00.8 ENCOUNTER FOR OTHER GENERAL EXAMINATION: ICD-10-CM

## 2022-02-03 PROCEDURE — 76775 US EXAM ABDO BACK WALL LIM: CPT

## 2022-02-03 PROCEDURE — 76775 US EXAM ABDO BACK WALL LIM: CPT | Mod: 26

## 2022-03-31 ENCOUNTER — OUTPATIENT (OUTPATIENT)
Dept: OUTPATIENT SERVICES | Facility: HOSPITAL | Age: 57
LOS: 1 days | End: 2022-03-31
Payer: COMMERCIAL

## 2022-03-31 ENCOUNTER — APPOINTMENT (OUTPATIENT)
Dept: MAMMOGRAPHY | Facility: CLINIC | Age: 57
End: 2022-03-31
Payer: COMMERCIAL

## 2022-03-31 ENCOUNTER — APPOINTMENT (OUTPATIENT)
Dept: ULTRASOUND IMAGING | Facility: CLINIC | Age: 57
End: 2022-03-31
Payer: COMMERCIAL

## 2022-03-31 DIAGNOSIS — Z00.00 ENCOUNTER FOR GENERAL ADULT MEDICAL EXAMINATION WITHOUT ABNORMAL FINDINGS: ICD-10-CM

## 2022-03-31 PROCEDURE — 77063 BREAST TOMOSYNTHESIS BI: CPT | Mod: 26

## 2022-03-31 PROCEDURE — 77067 SCR MAMMO BI INCL CAD: CPT

## 2022-03-31 PROCEDURE — 76641 ULTRASOUND BREAST COMPLETE: CPT | Mod: 26,50

## 2022-03-31 PROCEDURE — 77067 SCR MAMMO BI INCL CAD: CPT | Mod: 26

## 2022-03-31 PROCEDURE — 76641 ULTRASOUND BREAST COMPLETE: CPT

## 2022-03-31 PROCEDURE — 77063 BREAST TOMOSYNTHESIS BI: CPT

## 2022-04-21 ENCOUNTER — APPOINTMENT (OUTPATIENT)
Dept: INTERNAL MEDICINE | Facility: CLINIC | Age: 57
End: 2022-04-21

## 2022-06-09 ENCOUNTER — APPOINTMENT (OUTPATIENT)
Dept: INTERNAL MEDICINE | Facility: CLINIC | Age: 57
End: 2022-06-09
Payer: COMMERCIAL

## 2022-06-09 ENCOUNTER — NON-APPOINTMENT (OUTPATIENT)
Age: 57
End: 2022-06-09

## 2022-06-09 VITALS
WEIGHT: 143 LBS | SYSTOLIC BLOOD PRESSURE: 137 MMHG | DIASTOLIC BLOOD PRESSURE: 81 MMHG | RESPIRATION RATE: 16 BRPM | TEMPERATURE: 98 F | HEIGHT: 59 IN | OXYGEN SATURATION: 99 % | HEART RATE: 88 BPM | BODY MASS INDEX: 28.83 KG/M2

## 2022-06-09 DIAGNOSIS — G43.909 MIGRAINE, UNSPECIFIED, NOT INTRACTABLE, W/OUT STATUS MIGRAINOSUS: ICD-10-CM

## 2022-06-09 DIAGNOSIS — M54.50 LOW BACK PAIN, UNSPECIFIED: ICD-10-CM

## 2022-06-09 DIAGNOSIS — Z87.891 PERSONAL HISTORY OF NICOTINE DEPENDENCE: ICD-10-CM

## 2022-06-09 DIAGNOSIS — U07.1 COVID-19: ICD-10-CM

## 2022-06-09 DIAGNOSIS — Z78.0 ASYMPTOMATIC MENOPAUSAL STATE: ICD-10-CM

## 2022-06-09 DIAGNOSIS — Z78.9 OTHER SPECIFIED HEALTH STATUS: ICD-10-CM

## 2022-06-09 DIAGNOSIS — R73.03 PREDIABETES.: ICD-10-CM

## 2022-06-09 DIAGNOSIS — G89.29 LOW BACK PAIN, UNSPECIFIED: ICD-10-CM

## 2022-06-09 DIAGNOSIS — K22.70 BARRETT'S ESOPHAGUS W/OUT DYSPLASIA: ICD-10-CM

## 2022-06-09 DIAGNOSIS — Z00.00 ENCOUNTER FOR GENERAL ADULT MEDICAL EXAMINATION W/OUT ABNORMAL FINDINGS: ICD-10-CM

## 2022-06-09 DIAGNOSIS — S80.01XD CONTUSION OF RIGHT KNEE, SUBSEQUENT ENCOUNTER: ICD-10-CM

## 2022-06-09 DIAGNOSIS — R82.994 HYPERCALCIURIA: ICD-10-CM

## 2022-06-09 DIAGNOSIS — J30.9 ALLERGIC RHINITIS, UNSPECIFIED: ICD-10-CM

## 2022-06-09 PROCEDURE — 93000 ELECTROCARDIOGRAM COMPLETE: CPT

## 2022-06-09 PROCEDURE — 36415 COLL VENOUS BLD VENIPUNCTURE: CPT

## 2022-06-09 PROCEDURE — 99386 PREV VISIT NEW AGE 40-64: CPT | Mod: 25

## 2022-06-09 RX ORDER — BUTALBITAL, ACETAMINOPHEN, AND CAFFEINE 50; 300; 40 MG/1; MG/1; MG/1
50-300-40 CAPSULE ORAL
Refills: 0 | Status: ACTIVE | COMMUNITY

## 2022-06-09 RX ORDER — FAMOTIDINE 20 MG/1
20 TABLET, FILM COATED ORAL
Refills: 0 | Status: DISCONTINUED | COMMUNITY
End: 2022-06-09

## 2022-06-09 RX ORDER — CYCLOBENZAPRINE HCL 10 MG
10 TABLET ORAL
Refills: 0 | Status: ACTIVE | COMMUNITY

## 2022-06-10 PROBLEM — J30.9 ALLERGIC RHINITIS: Status: ACTIVE | Noted: 2022-06-09

## 2022-06-10 PROBLEM — R82.994 HYPERCALCIURIA: Status: ACTIVE | Noted: 2022-06-09

## 2022-06-10 PROBLEM — Z00.00 ENCOUNTER FOR PREVENTIVE HEALTH EXAMINATION: Status: ACTIVE | Noted: 2017-10-02

## 2022-06-10 PROBLEM — G43.909 MIGRAINES: Status: ACTIVE | Noted: 2022-06-09

## 2022-06-10 PROBLEM — Z87.891 FORMER SMOKER: Status: ACTIVE | Noted: 2018-10-12

## 2022-06-10 PROBLEM — R73.03 PREDIABETES: Status: ACTIVE | Noted: 2022-06-09

## 2022-06-10 PROBLEM — Z78.0 POSTMENOPAUSE: Status: ACTIVE | Noted: 2022-06-09

## 2022-06-10 PROBLEM — U07.1 COVID-19: Status: RESOLVED | Noted: 2022-06-09 | Resolved: 2022-06-10

## 2022-06-10 PROBLEM — M54.50 CHRONIC BILATERAL LOW BACK PAIN WITHOUT SCIATICA: Status: ACTIVE | Noted: 2018-10-12

## 2022-06-10 PROBLEM — K22.70 BARRETT'S ESOPHAGUS: Status: ACTIVE | Noted: 2022-06-09

## 2022-06-11 LAB
25(OH)D3 SERPL-MCNC: 35.6 NG/ML
ALBUMIN SERPL ELPH-MCNC: 4.7 G/DL
ALP BLD-CCNC: 89 U/L
ALT SERPL-CCNC: 41 U/L
ANION GAP SERPL CALC-SCNC: 12 MMOL/L
APPEARANCE: CLEAR
AST SERPL-CCNC: 33 U/L
BASOPHILS # BLD AUTO: 0.04 K/UL
BASOPHILS NFR BLD AUTO: 0.6 %
BILIRUB SERPL-MCNC: 0.4 MG/DL
BILIRUBIN URINE: NEGATIVE
BLOOD URINE: NEGATIVE
BUN SERPL-MCNC: 18 MG/DL
CALCIUM SERPL-MCNC: 10 MG/DL
CALCIUM SERPL-MCNC: 10 MG/DL
CHLORIDE SERPL-SCNC: 102 MMOL/L
CHOLEST SERPL-MCNC: 160 MG/DL
CO2 SERPL-SCNC: 26 MMOL/L
COLOR: NORMAL
CREAT SERPL-MCNC: 0.84 MG/DL
EGFR: 81 ML/MIN/1.73M2
EOSINOPHIL # BLD AUTO: 0.14 K/UL
EOSINOPHIL NFR BLD AUTO: 2 %
ESTIMATED AVERAGE GLUCOSE: 137 MG/DL
GLUCOSE QUALITATIVE U: NEGATIVE
GLUCOSE SERPL-MCNC: 108 MG/DL
HBA1C MFR BLD HPLC: 6.4 %
HCT VFR BLD CALC: 37.8 %
HDLC SERPL-MCNC: 45 MG/DL
HGB BLD-MCNC: 11.9 G/DL
IMM GRANULOCYTES NFR BLD AUTO: 0.3 %
KETONES URINE: NEGATIVE
LDLC SERPL CALC-MCNC: 90 MG/DL
LEUKOCYTE ESTERASE URINE: NEGATIVE
LYMPHOCYTES # BLD AUTO: 2.79 K/UL
LYMPHOCYTES NFR BLD AUTO: 39.5 %
MAN DIFF?: NORMAL
MCHC RBC-ENTMCNC: 30.9 PG
MCHC RBC-ENTMCNC: 31.5 GM/DL
MCV RBC AUTO: 98.2 FL
MONOCYTES # BLD AUTO: 0.62 K/UL
MONOCYTES NFR BLD AUTO: 8.8 %
NEUTROPHILS # BLD AUTO: 3.45 K/UL
NEUTROPHILS NFR BLD AUTO: 48.8 %
NITRITE URINE: NEGATIVE
NONHDLC SERPL-MCNC: 114 MG/DL
PARATHYROID HORMONE INTACT: 26 PG/ML
PH URINE: 7.5
PLATELET # BLD AUTO: 356 K/UL
POTASSIUM SERPL-SCNC: 3.3 MMOL/L
PROT SERPL-MCNC: 7.4 G/DL
PROTEIN URINE: NEGATIVE
RBC # BLD: 3.85 M/UL
RBC # FLD: 14.2 %
SODIUM SERPL-SCNC: 140 MMOL/L
SPECIFIC GRAVITY URINE: 1.01
TRIGL SERPL-MCNC: 123 MG/DL
TSH SERPL-ACNC: 1.51 UIU/ML
UROBILINOGEN URINE: NORMAL
WBC # FLD AUTO: 7.06 K/UL

## 2022-08-04 ENCOUNTER — APPOINTMENT (OUTPATIENT)
Dept: PHARMACY | Facility: CLINIC | Age: 57
End: 2022-08-04

## 2022-08-04 PROCEDURE — V5010 ASSESSMENT FOR HEARING AID: CPT

## 2022-08-18 ENCOUNTER — APPOINTMENT (OUTPATIENT)
Dept: CT IMAGING | Facility: CLINIC | Age: 57
End: 2022-08-18

## 2022-08-18 ENCOUNTER — OUTPATIENT (OUTPATIENT)
Dept: OUTPATIENT SERVICES | Facility: HOSPITAL | Age: 57
LOS: 1 days | End: 2022-08-18
Payer: COMMERCIAL

## 2022-08-18 DIAGNOSIS — J32.8 OTHER CHRONIC SINUSITIS: ICD-10-CM

## 2022-08-18 DIAGNOSIS — Z00.8 ENCOUNTER FOR OTHER GENERAL EXAMINATION: ICD-10-CM

## 2022-08-18 PROCEDURE — 70486 CT MAXILLOFACIAL W/O DYE: CPT | Mod: 26

## 2022-08-18 PROCEDURE — 70486 CT MAXILLOFACIAL W/O DYE: CPT

## 2022-08-28 ENCOUNTER — TRANSCRIPTION ENCOUNTER (OUTPATIENT)
Age: 57
End: 2022-08-28

## 2022-09-15 ENCOUNTER — APPOINTMENT (OUTPATIENT)
Dept: PHARMACY | Facility: CLINIC | Age: 57
End: 2022-09-15

## 2022-09-15 PROCEDURE — V5261C: CUSTOM

## 2022-09-27 RX ORDER — CELECOXIB 200 MG/1
200 CAPSULE ORAL
Qty: 60 | Refills: 2 | Status: DISCONTINUED | COMMUNITY
Start: 2019-12-08 | End: 2022-09-27

## 2022-09-27 RX ORDER — CHLORTHALIDONE 25 MG/1
25 TABLET ORAL DAILY
Refills: 0 | Status: ACTIVE | COMMUNITY

## 2022-09-27 RX ORDER — MONTELUKAST 10 MG/1
10 TABLET, FILM COATED ORAL
Refills: 0 | Status: ACTIVE | COMMUNITY

## 2022-09-27 RX ORDER — DICLOFENAC SODIUM 10 MG/G
1 GEL TOPICAL DAILY
Qty: 3 | Refills: 0 | Status: DISCONTINUED | COMMUNITY
Start: 2019-12-05 | End: 2022-09-27

## 2022-09-29 ENCOUNTER — NON-APPOINTMENT (OUTPATIENT)
Age: 57
End: 2022-09-29

## 2022-09-29 ENCOUNTER — APPOINTMENT (OUTPATIENT)
Dept: CARDIOLOGY | Facility: CLINIC | Age: 57
End: 2022-09-29

## 2022-09-29 VITALS — BODY MASS INDEX: 29.84 KG/M2 | HEART RATE: 89 BPM | HEIGHT: 59 IN | OXYGEN SATURATION: 97 % | WEIGHT: 148 LBS

## 2022-09-29 VITALS — SYSTOLIC BLOOD PRESSURE: 142 MMHG | DIASTOLIC BLOOD PRESSURE: 78 MMHG

## 2022-09-29 DIAGNOSIS — R01.1 CARDIAC MURMUR, UNSPECIFIED: ICD-10-CM

## 2022-09-29 PROCEDURE — 93000 ELECTROCARDIOGRAM COMPLETE: CPT

## 2022-09-29 PROCEDURE — 99204 OFFICE O/P NEW MOD 45 MIN: CPT

## 2022-09-29 NOTE — DISCUSSION/SUMMARY
[FreeTextEntry1] : 57 year woman with a history as listed presents for an initial cardiac evaluation. \par Mora is complainign of more dyspnea on exertion. Her EKG did not reveal any significant ischemic changes. She will undergo a treadmill exercise stress test to define exercise tolerance, rule out exertional hypertensive responses, assess for exercise induced arrhythmias and rule out ischemia from obstructive CAD. She will get a 2d echo to assess for any  new structural heart disease, changes in valvular and ventricular function. \par Her blood pressure is elevated. 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 She will try to maintain a BP log at home. Reducing dietary salt intake advised. \par She will get a carotid duplex to assess for progression. \par Her lipid panel was at goal on her current medications. . \par Exercise and diet counseling was performed in order to reduce her future cardiovascular risk. \par She will followup with me in 3 months  months or sooner if necessary.  [EKG obtained to assist in diagnosis and management of assessed problem(s)] : EKG obtained to assist in diagnosis and management of assessed problem(s)

## 2022-09-29 NOTE — HISTORY OF PRESENT ILLNESS
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 \par She was last here in 2018. \par She has been having mild neck pain that is random in nature. She is worried given her carotid stenosis. Intermittently she has been having palpitations with her heartbeat being offbeat, that last a few minutes happens once every 2-3 months. \par She intermittently will feel a fullness in her chest which she has been attributed to GERD. She was + for H pylori. She gets mild Dyspnea with strenuous activity. She   denies any  PND, orthopnea, lower extremity edema, near syncope, syncope, palpitations, strokelike symptoms.  \par Medication reconciliation performed. She is taking Chlorthalidone 3 x a week.

## 2022-09-29 NOTE — PHYSICAL EXAM
[Well Groomed] : well groomed [General Appearance - In No Acute Distress] : no acute distress [Normal Conjunctiva] : the conjunctiva exhibited no abnormalities [Eyelids - No Xanthelasma] : the eyelids demonstrated no xanthelasmas [Normal Oral Mucosa] : normal oral mucosa [No Oral Pallor] : no oral pallor [No Oral Cyanosis] : no oral cyanosis [Normal Jugular Venous A Waves Present] : normal jugular venous A waves present [Normal Jugular Venous V Waves Present] : normal jugular venous V waves present [No Jugular Venous Henriquez A Waves] : no jugular venous henriquez A waves [Normal Rate] : normal [Rhythm Regular] : regular [Normal S1] : normal S1 [Normal S2] : normal S2 [No Gallop] : no gallop heard [II] : a grade 2 [2+] : left 2+ [No Pitting Edema] : no pitting edema present [Respiration, Rhythm And Depth] : normal respiratory rhythm and effort [Exaggerated Use Of Accessory Muscles For Inspiration] : no accessory muscle use [Auscultation Breath Sounds / Voice Sounds] : lungs were clear to auscultation bilaterally [Abdomen Soft] : soft [Abdomen Tenderness] : non-tender [Abdomen Mass (___ Cm)] : no abdominal mass palpated [Abnormal Walk] : normal gait [Gait - Sufficient For Exercise Testing] : the gait was sufficient for exercise testing [Nail Clubbing] : no clubbing of the fingernails [Cyanosis, Localized] : no localized cyanosis [Petechial Hemorrhages (___cm)] : no petechial hemorrhages [Skin Color & Pigmentation] : normal skin color and pigmentation [] : no rash [No Venous Stasis] : no venous stasis [Skin Lesions] : no skin lesions [No Skin Ulcers] : no skin ulcer [No Xanthoma] : no  xanthoma was observed [Oriented To Time, Place, And Person] : oriented to person, place, and time [Affect] : the affect was normal [Mood] : the mood was normal [No Anxiety] : not feeling anxious [Right Carotid Bruit] : no bruit heard over the right carotid [Left Carotid Bruit] : no bruit heard over the left carotid [Bruit] : no bruit heard

## 2022-10-02 ENCOUNTER — NON-APPOINTMENT (OUTPATIENT)
Age: 57
End: 2022-10-02

## 2022-10-06 ENCOUNTER — APPOINTMENT (OUTPATIENT)
Dept: PHARMACY | Facility: CLINIC | Age: 57
End: 2022-10-06

## 2022-10-06 PROCEDURE — V5299A: CUSTOM | Mod: NC

## 2022-10-20 ENCOUNTER — APPOINTMENT (OUTPATIENT)
Dept: CARDIOLOGY | Facility: CLINIC | Age: 57
End: 2022-10-20

## 2022-10-20 DIAGNOSIS — R07.89 OTHER CHEST PAIN: ICD-10-CM

## 2022-10-20 PROCEDURE — 93015 CV STRESS TEST SUPVJ I&R: CPT

## 2022-10-27 ENCOUNTER — APPOINTMENT (OUTPATIENT)
Dept: CARDIOLOGY | Facility: CLINIC | Age: 57
End: 2022-10-27

## 2022-10-27 PROCEDURE — 93306 TTE W/DOPPLER COMPLETE: CPT

## 2022-10-27 PROCEDURE — 93880 EXTRACRANIAL BILAT STUDY: CPT

## 2022-11-07 ENCOUNTER — NON-APPOINTMENT (OUTPATIENT)
Age: 57
End: 2022-11-07

## 2022-11-08 ENCOUNTER — APPOINTMENT (OUTPATIENT)
Dept: CARDIOLOGY | Facility: CLINIC | Age: 57
End: 2022-11-08
Payer: COMMERCIAL

## 2022-11-08 PROCEDURE — 78452 HT MUSCLE IMAGE SPECT MULT: CPT

## 2022-11-08 PROCEDURE — 93015 CV STRESS TEST SUPVJ I&R: CPT

## 2022-11-08 PROCEDURE — A9500: CPT

## 2022-11-17 ENCOUNTER — OUTPATIENT (OUTPATIENT)
Dept: OUTPATIENT SERVICES | Facility: HOSPITAL | Age: 57
LOS: 1 days | End: 2022-11-17
Payer: COMMERCIAL

## 2022-11-17 ENCOUNTER — APPOINTMENT (OUTPATIENT)
Dept: ULTRASOUND IMAGING | Facility: CLINIC | Age: 57
End: 2022-11-17

## 2022-11-17 ENCOUNTER — APPOINTMENT (OUTPATIENT)
Dept: RHEUMATOLOGY | Facility: CLINIC | Age: 57
End: 2022-11-17

## 2022-11-17 VITALS
OXYGEN SATURATION: 98 % | HEIGHT: 59 IN | TEMPERATURE: 98.6 F | BODY MASS INDEX: 28.22 KG/M2 | HEART RATE: 90 BPM | RESPIRATION RATE: 16 BRPM | SYSTOLIC BLOOD PRESSURE: 147 MMHG | DIASTOLIC BLOOD PRESSURE: 83 MMHG | WEIGHT: 140 LBS

## 2022-11-17 DIAGNOSIS — Z00.00 ENCOUNTER FOR GENERAL ADULT MEDICAL EXAMINATION WITHOUT ABNORMAL FINDINGS: ICD-10-CM

## 2022-11-17 DIAGNOSIS — E83.52 HYPERCALCEMIA: ICD-10-CM

## 2022-11-17 PROCEDURE — 76700 US EXAM ABDOM COMPLETE: CPT | Mod: 26

## 2022-11-17 PROCEDURE — 76700 US EXAM ABDOM COMPLETE: CPT

## 2022-11-17 PROCEDURE — 99204 OFFICE O/P NEW MOD 45 MIN: CPT | Mod: GC

## 2022-11-18 PROBLEM — E83.52 HYPERCALCEMIA: Status: ACTIVE | Noted: 2022-11-18

## 2022-11-18 NOTE — HISTORY OF PRESENT ILLNESS
[Morning Stiffness] : morning stiffness [Myalgias] : myalgias [Muscle Cramping] : muscle cramping [FreeTextEntry1] : 57yF with HLD on statin, GERD, cardiac murmur, family hx of CAD, here for generalized myalgia for 10 years  \par \par pt  experienced myalgia and cramps for many years, on and off, thought it was related to statin exposure \par in 2016,  pt was off statin for 2-3 months, symptoms improved, but resumed statin due to elevated lipid panel and strong Fhx of CAD.\par \par described muscle pain in neck, b/l shoulder and arms, torso, not so much the LE \par also some muscle stiffness in AM. 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   \par \par PSH: none \par \par Labs reviewed\par had +SABINA (1:160) in 2018, pt unclear why SABINA was checked \par has mild AST/ALT (41/58) elevation in Aug 2022, PCP ordered Abd US  \par  [Fever] : no fever [Chills] : no chills [Fatigue] : no fatigue [Skin Lesions] : no lesions [Oral Ulcers] : no oral ulcers [Shortness of Breath] : no shortness of breath [Chest Pain] : no chest pain [Arthralgias] : no arthralgias [Joint Swelling] : no joint swelling [Joint Warmth] : no joint warmth [Joint Deformity] : no joint deformity [Decreased ROM] : no decreased range of motion [Difficulty Standing] : no difficulty standing [Difficulty Walking] : no difficulty walking [Muscle Weakness] : no muscle weakness [Muscle Spasms] : no muscle spasms

## 2022-11-18 NOTE — ASSESSMENT
[FreeTextEntry1] : 57yF with HLD on statin, GERD, systolic murmur, family hx of CAD, here for generalized myalgia for 10 years \par lab significant for + SABINA (1:160) in 2018, and mild elevation in AST/ALT on Aug 2022 lab (new). \par \par # Chronic generalized myalgia \par - differential: metabolic vs statin- associated myopathy vs statin - induced immune mediated necrotizing myopathy \par - check CMP, CK, aldose, myoglobin, aldose, HMGCR Ab \par - pt has not address myalgia with cardiology or PCP, if lab work unrevealing, can consider switch from atorvastatin to a different statin \par \par # SABINA (1:160 in 2018), RF, CCP negative \par - ROS negative for CTD, exam is negative for synovitis and rash \par - check thyroid Ab, lupus serologies, scleroderma serologies, U P/Cr, UA \par \par Case discussed with Dr. Childs \par f/u in 3-4 weeks\par \par Can Hu\par PGY-5

## 2022-11-18 NOTE — PHYSICAL EXAM
[General Appearance - Alert] : alert [General Appearance - In No Acute Distress] : in no acute distress [General Appearance - Well Nourished] : well nourished [Sclera] : the sclera and conjunctiva were normal [Extraocular Movements] : extraocular movements were intact [Outer Ear] : the ears and nose were normal in appearance [Hearing Threshold Finger Rub Not Wythe] : hearing was normal [Neck Appearance] : the appearance of the neck was normal [Jugular Venous Distention Increased] : there was no jugular-venous distention [Exaggerated Use Of Accessory Muscles For Inspiration] : no accessory muscle use [Auscultation Breath Sounds / Voice Sounds] : lungs were clear to auscultation bilaterally [Heart Sounds] : normal S1 and S2 [Edema] : there was no peripheral edema [] : no rash [No Focal Deficits] : no focal deficits [Oriented To Time, Place, And Person] : oriented to person, place, and time [FreeTextEntry1] : no synovitis, joints NTP, muscle strength 5/5 in all 4 extremities , + tender point in b/l shoulder

## 2022-11-18 NOTE — END OF VISIT
[] : Fellow [FreeTextEntry3] : Addendum 11/18/2022\par labs reviewed and discussed in patient in detail, all questions answered\par CPK mildly elevated \par Hypercalcecmia--? etiology of myalgia \par add PTH\par patient is off calcium supplements; advised to f/u with endocrinologist\par

## 2022-11-23 LAB
ALBUMIN SERPL ELPH-MCNC: 5.3 G/DL
ALDOLASE SERPL-CCNC: 3.8 U/L
ALP BLD-CCNC: 119 U/L
ALT SERPL-CCNC: 51 U/L
ANA PAT FLD IF-IMP: ABNORMAL
ANA SER IF-ACNC: ABNORMAL
ANION GAP SERPL CALC-SCNC: 13 MMOL/L
APPEARANCE: ABNORMAL
APTT BLD: 32.5 SEC
AST SERPL-CCNC: 34 U/L
B2 GLYCOPROT1 AB SER QL: NEGATIVE
BACTERIA: NEGATIVE
BASOPHILS # BLD AUTO: 0.04 K/UL
BASOPHILS NFR BLD AUTO: 0.5 %
BILIRUB SERPL-MCNC: 0.4 MG/DL
BILIRUBIN URINE: NEGATIVE
BLOOD URINE: NEGATIVE
BUN SERPL-MCNC: 13 MG/DL
CALCIUM SERPL-MCNC: 11.6 MG/DL
CALCIUM SERPL-MCNC: 11.7 MG/DL
CARDIOLIPIN AB SER IA-ACNC: NEGATIVE
CENTROMERE IGG SER-ACNC: <0.2 CD:130001892
CHLORIDE SERPL-SCNC: 102 MMOL/L
CK SERPL-CCNC: 206 U/L
CO2 SERPL-SCNC: 28 MMOL/L
COLOR: YELLOW
CREAT SERPL-MCNC: 0.79 MG/DL
CREAT SPEC-SCNC: 53 MG/DL
CREAT/PROT UR: 0.1 RATIO
CRP SERPL-MCNC: <3 MG/L
DSDNA AB SER-ACNC: <12 IU/ML
EGFR: 87 ML/MIN/1.73M2
ENA RNP AB SER IA-ACNC: <0.2 AL
ENA SCL70 IGG SER IA-ACNC: <0.2 AL
ENA SM AB SER IA-ACNC: <0.2 AL
ENA SS-A AB SER IA-ACNC: <0.2 AL
ENA SS-B AB SER IA-ACNC: <0.2 AL
EOSINOPHIL # BLD AUTO: 0.05 K/UL
EOSINOPHIL NFR BLD AUTO: 0.6 %
ERYTHROCYTE [SEDIMENTATION RATE] IN BLOOD BY WESTERGREN METHOD: 4 MM/HR
GGT SERPL-CCNC: 31 U/L
GLUCOSE QUALITATIVE U: NEGATIVE
GLUCOSE SERPL-MCNC: 100 MG/DL
HCT VFR BLD CALC: 43.4 %
HGB BLD-MCNC: 14 G/DL
HYALINE CASTS: 0 /LPF
IMM GRANULOCYTES NFR BLD AUTO: 0.3 %
KETONES URINE: NEGATIVE
LEUKOCYTE ESTERASE URINE: NEGATIVE
LYMPHOCYTES # BLD AUTO: 2.95 K/UL
LYMPHOCYTES NFR BLD AUTO: 37.9 %
MAN DIFF?: NORMAL
MCHC RBC-ENTMCNC: 30.7 PG
MCHC RBC-ENTMCNC: 32.3 GM/DL
MCV RBC AUTO: 95.2 FL
MICROSCOPIC-UA: NORMAL
MONOCYTES # BLD AUTO: 0.75 K/UL
MONOCYTES NFR BLD AUTO: 9.6 %
MYOGLOBIN SERPL-MCNC: 33 NG/ML
NEUTROPHILS # BLD AUTO: 3.98 K/UL
NEUTROPHILS NFR BLD AUTO: 51.1 %
NITRITE URINE: NEGATIVE
PARATHYROID HORMONE INTACT: 19 PG/ML
PH URINE: 8
PLATELET # BLD AUTO: 382 K/UL
POTASSIUM SERPL-SCNC: 3.6 MMOL/L
PROT SERPL-MCNC: 8 G/DL
PROT UR-MCNC: 5 MG/DL
PROTEIN URINE: NEGATIVE
RBC # BLD: 4.56 M/UL
RBC # FLD: 12.8 %
RED BLOOD CELLS URINE: 0 /HPF
RNA POLYMERASE III IGG: 21 UNITS
SODIUM SERPL-SCNC: 144 MMOL/L
SPECIFIC GRAVITY URINE: 1.01
SQUAMOUS EPITHELIAL CELLS: 0 /HPF
THYROGLOB AB SERPL-ACNC: <20 IU/ML
THYROPEROXIDASE AB SERPL IA-ACNC: <10 IU/ML
TSH SERPL-ACNC: 1.99 UIU/ML
UROBILINOGEN URINE: NORMAL
WBC # FLD AUTO: 7.79 K/UL
WHITE BLOOD CELLS URINE: 0 /HPF

## 2022-11-26 LAB — HMGCR ANTIBODY, IGG: 6 UNITS

## 2022-12-16 ENCOUNTER — APPOINTMENT (OUTPATIENT)
Dept: RHEUMATOLOGY | Facility: CLINIC | Age: 57
End: 2022-12-16

## 2022-12-16 VITALS
TEMPERATURE: 97.6 F | RESPIRATION RATE: 16 BRPM | BODY MASS INDEX: 28.02 KG/M2 | SYSTOLIC BLOOD PRESSURE: 131 MMHG | HEART RATE: 88 BPM | OXYGEN SATURATION: 98 % | WEIGHT: 139 LBS | HEIGHT: 59 IN | DIASTOLIC BLOOD PRESSURE: 82 MMHG

## 2022-12-16 PROCEDURE — 99213 OFFICE O/P EST LOW 20 MIN: CPT

## 2022-12-20 LAB
ALBUMIN SERPL ELPH-MCNC: 4.7 G/DL
ALP BLD-CCNC: 107 U/L
ALT SERPL-CCNC: 43 U/L
AST SERPL-CCNC: 31 U/L
BILIRUB DIRECT SERPL-MCNC: 0.2 MG/DL
BILIRUB INDIRECT SERPL-MCNC: 0.4 MG/DL
BILIRUB SERPL-MCNC: 0.5 MG/DL
CK SERPL-CCNC: 146 U/L
PROT SERPL-MCNC: 7.2 G/DL

## 2022-12-20 NOTE — HISTORY OF PRESENT ILLNESS
[FreeTextEntry1] : Myalgias are are somewhat better. She is not sure what improved it. \par No muscle weakness or stiffness \par Seen by endocrinologist and notes reviewed: calcium levels are improving and LFTs improving.  PTH levels are decreasing.  ? related to chlorthalidone and related to increased alcohol intake.  \par \par

## 2022-12-20 NOTE — ASSESSMENT
[FreeTextEntry1] : 1. Myalgia, improved \par CPK elevated at the time of the last visit \par repeat labs \par 2. Abnormal LFTs\par check hepatic function panel \par \par OV 6 months, sooner PRN \par

## 2022-12-22 ENCOUNTER — NON-APPOINTMENT (OUTPATIENT)
Age: 57
End: 2022-12-22

## 2022-12-22 ENCOUNTER — APPOINTMENT (OUTPATIENT)
Dept: CARDIOLOGY | Facility: CLINIC | Age: 57
End: 2022-12-22

## 2022-12-22 VITALS — SYSTOLIC BLOOD PRESSURE: 134 MMHG | DIASTOLIC BLOOD PRESSURE: 82 MMHG

## 2022-12-22 VITALS
BODY MASS INDEX: 29.23 KG/M2 | WEIGHT: 145 LBS | OXYGEN SATURATION: 98 % | HEART RATE: 72 BPM | DIASTOLIC BLOOD PRESSURE: 83 MMHG | HEIGHT: 59 IN | SYSTOLIC BLOOD PRESSURE: 146 MMHG

## 2022-12-22 PROCEDURE — 93000 ELECTROCARDIOGRAM COMPLETE: CPT

## 2022-12-22 PROCEDURE — 99214 OFFICE O/P EST MOD 30 MIN: CPT

## 2022-12-22 NOTE — DISCUSSION/SUMMARY
[EKG obtained to assist in diagnosis and management of assessed problem(s)] : EKG obtained to assist in diagnosis and management of assessed problem(s) [FreeTextEntry1] : 57 year woman with a history as listed presents for an initial cardiac evaluation. \par Mora is relatively doing well. She denies any anginal symptoms. Clinically she is euvolemic on exam. Her EKG did not reveal any significant ischemic changes. \par Her blood pressure is elevated. 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 I would like her to increase her Chlorthalidone to 25mg qday. Will need to speak to her endocrine. She will try to maintain a BP log at home. Reducing dietary salt intake advised. \par Her lipid panel was at goal on her current medications. . \par Exercise and diet counseling was performed in order to reduce her future cardiovascular risk. \par She will followup with me in 3 months  months or sooner if necessary.

## 2022-12-22 NOTE — CARDIOLOGY SUMMARY
[de-identified] : Sinus  Rhythm \par Low voltage in precordial leads. \par  -  Nonspecific T-abnormality.  [de-identified] : 11/8/22 TMET 7 METs fair ET neg ischemia  [de-identified] : 10/2022 nroaml LV function. mild AS  [de-identified] : 10/27/22 no carotid disease.

## 2022-12-22 NOTE — HISTORY OF PRESENT ILLNESS
[FreeTextEntry1] : 57  year old woman with history cardiac murmur, strong family history of CAD, mild carotid stenosis, calciuria (on chlorthalidone), fatty liver presents to followup visit. \par \par Since her last visit she has been feeling well. Her blood pressure at home have been 120-130s/70-85. \par her neck and chest discomfort has improved. She now is H pylori negative. \par She does not exercise formally. She has mil intermittent dyspnea on strenuous exertion. \par She   denies any  PND, orthopnea, lower extremity edema, near syncope, syncope, palpitations, strokelike symptoms.  She is complaining of more muscle aches. \par Medication reconciliation performed.

## 2023-01-31 RX ORDER — ATORVASTATIN CALCIUM 40 MG/1
40 TABLET, FILM COATED ORAL DAILY
Refills: 0 | Status: DISCONTINUED | COMMUNITY
End: 2023-01-31

## 2023-02-09 ENCOUNTER — APPOINTMENT (OUTPATIENT)
Dept: INTERNAL MEDICINE | Facility: CLINIC | Age: 58
End: 2023-02-09

## 2023-02-19 LAB — RNA POLYMERASE III IGG: 24 UNITS

## 2023-04-03 ENCOUNTER — APPOINTMENT (OUTPATIENT)
Dept: HEPATOLOGY | Facility: CLINIC | Age: 58
End: 2023-04-03

## 2023-04-06 ENCOUNTER — APPOINTMENT (OUTPATIENT)
Dept: HEPATOLOGY | Facility: CLINIC | Age: 58
End: 2023-04-06
Payer: COMMERCIAL

## 2023-04-06 PROCEDURE — 99204 OFFICE O/P NEW MOD 45 MIN: CPT

## 2023-04-06 RX ORDER — CHOLECALCIFEROL (VITAMIN D3) 25 MCG
TABLET ORAL
Refills: 0 | Status: DISCONTINUED | COMMUNITY
End: 2023-04-06

## 2023-04-06 RX ORDER — TETRACYCLINE HYDROCHLORIDE 500 MG/1
500 CAPSULE ORAL
Qty: 56 | Refills: 0 | Status: DISCONTINUED | COMMUNITY
Start: 2022-10-17

## 2023-04-06 RX ORDER — ASCORBIC ACID 500 MG
TABLET ORAL
Refills: 0 | Status: DISCONTINUED | COMMUNITY
End: 2023-04-06

## 2023-04-06 RX ORDER — METRONIDAZOLE 500 MG/1
500 TABLET ORAL
Qty: 42 | Refills: 0 | Status: DISCONTINUED | COMMUNITY
Start: 2022-10-17

## 2023-04-06 RX ORDER — TOCOPHERSOLAN (VITAMIN E TPGS) 400/15ML
LIQUID (ML) ORAL
Refills: 0 | Status: DISCONTINUED | COMMUNITY
End: 2023-04-06

## 2023-04-06 RX ORDER — MULTIVITAMIN
TABLET ORAL
Refills: 0 | Status: DISCONTINUED | COMMUNITY
End: 2023-04-06

## 2023-04-06 RX ORDER — COLD-HOT PACK
EACH MISCELLANEOUS
Refills: 0 | Status: DISCONTINUED | COMMUNITY
End: 2023-04-06

## 2023-04-06 NOTE — ASSESSMENT
[FreeTextEntry1] : 57 yoF with pmhx HTN, HLD, prediabetes here for evaluation of elevated liver enzymes and NAFLD referred by her GI. \par \par #Elevated liver enzymes\par Mildly elevated transaminases since Aug 2022 ALT ranging 40-50s, likely due to fatty liver. Low risk for dili. No excessive alcohol consumption. \par \par #NAFLD\par -Seen on both abdo US and CT since 2020. \par -Risks are prediabetes, hyperlipidemia and HTN. \par -Fibroscan test ordered to evaluate for fibrosis \par -Explained no current FDA approved treatment. I reviewed the natural history, evaluation and staging of the disease. We also discussed lifestyle modifications for management. I recommended increased consumption of vegetables and lean proteins, avoidance of red meat and high fructose corn syrup, and avoidance of calorie-containing beverages.C/w walking for exercise. \par \par # Health maintenance\par -Will check HAV and HBV serologies and offer vaccination if non-immune.\par \par Plan:\par BW, fibroscan test, will call to review.

## 2023-04-06 NOTE — REVIEW OF SYSTEMS
[Fever] : no fever [Chills] : no chills [Feeling Tired] : not feeling tired [Chest Pain] : no chest pain [Shortness Of Breath] : no shortness of breath [Cough] : no cough [Abdominal Pain] : no abdominal pain [Melena] : no melena [Negative] : Heme/Lymph [FreeTextEntry9] : back and knee pain

## 2023-04-06 NOTE — HISTORY OF PRESENT ILLNESS
[de-identified] : 57 yoF with pmhx HTN, HLD, prediabetes here for evaluation of elevated liver enzymes and NAFLD referred by her GI. She was told she has fatty liver about 2 yrs ago based on abdo CT. Her liver enzymes became mildly elevated since Aug 2022 ALT ranging 40-50s. \par \par Reports that her weight fluctuate between 4-5 lbs depending if she exercise. She walks for exercise but sometimes less due to back and knee pain. She eats overall healthy, mainly low carbs and low fat. \par \par Drinks alcohol about 2-3 glasses of wine a week and once a month vodka/cranberry mix. \par No family h/o liver disease\par Meds reviewed and listed.\par \par -BW 4/3/23 ALT 49, AST 35, A1c 6.2\par Was neg for HCV in past BW. \par -Abdo US 11/17/22 hepatic steatosis\par -Abdo CT 2020 fatty liver

## 2023-04-10 LAB
HBV CORE IGG+IGM SER QL: NONREACTIVE
HBV SURFACE AB SER QL: NONREACTIVE
HBV SURFACE AG SER QL: NONREACTIVE
HEPATITIS A IGG ANTIBODY: NONREACTIVE
SMOOTH MUSCLE AB SER QL IF: NORMAL

## 2023-04-20 ENCOUNTER — APPOINTMENT (OUTPATIENT)
Dept: HEPATOLOGY | Facility: CLINIC | Age: 58
End: 2023-04-20
Payer: COMMERCIAL

## 2023-04-20 DIAGNOSIS — R79.89 OTHER SPECIFIED ABNORMAL FINDINGS OF BLOOD CHEMISTRY: ICD-10-CM

## 2023-04-20 DIAGNOSIS — K76.0 FATTY (CHANGE OF) LIVER, NOT ELSEWHERE CLASSIFIED: ICD-10-CM

## 2023-04-20 PROCEDURE — 76981 USE PARENCHYMA: CPT

## 2023-04-24 ENCOUNTER — NON-APPOINTMENT (OUTPATIENT)
Age: 58
End: 2023-04-24

## 2023-04-24 PROBLEM — K76.0 FATTY LIVER: Status: ACTIVE | Noted: 2022-06-09

## 2023-04-24 PROBLEM — R79.89 ABNORMAL LFTS: Status: ACTIVE | Noted: 2022-12-20

## 2023-05-04 ENCOUNTER — OUTPATIENT (OUTPATIENT)
Dept: OUTPATIENT SERVICES | Facility: HOSPITAL | Age: 58
LOS: 1 days | End: 2023-05-04
Payer: COMMERCIAL

## 2023-05-04 ENCOUNTER — APPOINTMENT (OUTPATIENT)
Dept: MAMMOGRAPHY | Facility: CLINIC | Age: 58
End: 2023-05-04
Payer: COMMERCIAL

## 2023-05-04 ENCOUNTER — APPOINTMENT (OUTPATIENT)
Dept: RADIOLOGY | Facility: CLINIC | Age: 58
End: 2023-05-04
Payer: COMMERCIAL

## 2023-05-04 ENCOUNTER — APPOINTMENT (OUTPATIENT)
Dept: ULTRASOUND IMAGING | Facility: CLINIC | Age: 58
End: 2023-05-04
Payer: COMMERCIAL

## 2023-05-04 DIAGNOSIS — Z00.8 ENCOUNTER FOR OTHER GENERAL EXAMINATION: ICD-10-CM

## 2023-05-04 PROCEDURE — 77063 BREAST TOMOSYNTHESIS BI: CPT | Mod: 26

## 2023-05-04 PROCEDURE — 77080 DXA BONE DENSITY AXIAL: CPT | Mod: 26

## 2023-05-04 PROCEDURE — 77063 BREAST TOMOSYNTHESIS BI: CPT

## 2023-05-04 PROCEDURE — 77067 SCR MAMMO BI INCL CAD: CPT | Mod: 26

## 2023-05-04 PROCEDURE — 77067 SCR MAMMO BI INCL CAD: CPT

## 2023-05-04 PROCEDURE — 76641 ULTRASOUND BREAST COMPLETE: CPT | Mod: 26,50

## 2023-05-04 PROCEDURE — 77080 DXA BONE DENSITY AXIAL: CPT

## 2023-05-04 PROCEDURE — 76641 ULTRASOUND BREAST COMPLETE: CPT

## 2023-06-07 ENCOUNTER — APPOINTMENT (OUTPATIENT)
Dept: SURGICAL ONCOLOGY | Facility: CLINIC | Age: 58
End: 2023-06-07
Payer: COMMERCIAL

## 2023-06-08 ENCOUNTER — APPOINTMENT (OUTPATIENT)
Dept: CARDIOLOGY | Facility: CLINIC | Age: 58
End: 2023-06-08

## 2023-06-28 ENCOUNTER — NON-APPOINTMENT (OUTPATIENT)
Age: 58
End: 2023-06-28

## 2023-06-28 ENCOUNTER — APPOINTMENT (OUTPATIENT)
Dept: SURGICAL ONCOLOGY | Facility: CLINIC | Age: 58
End: 2023-06-28
Payer: COMMERCIAL

## 2023-06-28 VITALS
OXYGEN SATURATION: 99 % | SYSTOLIC BLOOD PRESSURE: 139 MMHG | WEIGHT: 140 LBS | RESPIRATION RATE: 16 BRPM | DIASTOLIC BLOOD PRESSURE: 88 MMHG | BODY MASS INDEX: 28.22 KG/M2 | HEART RATE: 76 BPM | HEIGHT: 59 IN

## 2023-06-28 PROCEDURE — 99204 OFFICE O/P NEW MOD 45 MIN: CPT

## 2023-06-28 NOTE — ADDENDUM
[FreeTextEntry1] : I, Anjali Lentz, acted solely as a scribe for Dr. Kt Wilson on this date 06/28/2023.

## 2023-06-28 NOTE — ASSESSMENT
[FreeTextEntry1] : Long-standing bilateral nipple sensitivity \par No concerning clinical findings on exam \par BI-RADS 1 imaging May 2023\par Reassured patient that index of suspicion for malignancy is low \par Recommend vitamin e and/or primrose prn and wearing comfortable bra\par RTO 3 months for exam or as needed

## 2023-06-28 NOTE — HISTORY OF PRESENT ILLNESS
[de-identified] : 57 year female presents for an initial consultation, referred by her GYN Dr. Nessa Montoya.\par She reports a long-standing (>5 year) history of sensitivity in the nipple-areolar region. She feels relief without wearing a bra and reports fluctuating weight. \par \par She underwent annual bilateral mammogram/sonogram on 5/4/23 with benign findings, and no findings to explain her complaints of nipple sensitivity (BI-RADS 1).\par \par She denies any family history of breast cancer. \par Her PMH includes anxiety, migraines, HTN, GERD.\par Denies any breast biopsies.

## 2023-06-28 NOTE — CONSULT LETTER
[Dear  ___] : Dear  [unfilled], [Consult Letter:] : I had the pleasure of evaluating your patient, [unfilled]. [Please see my note below.] : Please see my note below. [Sincerely,] : Sincerely, [FreeTextEntry3] : Kt Wilson MD FACS

## 2023-06-28 NOTE — PHYSICAL EXAM
[Normal] : supple, no neck mass and thyroid not enlarged [Normal Neck Lymph Nodes] : normal neck lymph nodes  [Normal Supraclavicular Lymph Nodes] : normal supraclavicular lymph nodes [Normal Axillary Lymph Nodes] : normal axillary lymph nodes [Normal] : oriented to person, place and time, with appropriate affect [de-identified] : mild chronic bilateral nipple inversion.  no masses or adenopathy bilaterally.

## 2023-09-06 ENCOUNTER — APPOINTMENT (OUTPATIENT)
Dept: SURGICAL ONCOLOGY | Facility: CLINIC | Age: 58
End: 2023-09-06
Payer: COMMERCIAL

## 2023-09-06 VITALS
RESPIRATION RATE: 16 BRPM | WEIGHT: 140 LBS | DIASTOLIC BLOOD PRESSURE: 87 MMHG | BODY MASS INDEX: 28.22 KG/M2 | HEIGHT: 59 IN | OXYGEN SATURATION: 97 % | SYSTOLIC BLOOD PRESSURE: 141 MMHG | HEART RATE: 77 BPM

## 2023-09-06 DIAGNOSIS — N64.4 MASTODYNIA: ICD-10-CM

## 2023-09-06 PROCEDURE — 99214 OFFICE O/P EST MOD 30 MIN: CPT

## 2023-09-06 NOTE — ASSESSMENT
[FreeTextEntry1] : Long-standing bilateral nipple sensitivity  Significantly improved after wearing comfortable bra  Recommend vitamin e and/or primrose prn  Continue yearly breast imaging May 2024  RTO 1 year or prn

## 2023-09-06 NOTE — HISTORY OF PRESENT ILLNESS
[de-identified] : 58 year female presents a follow up for her 3 month exam.  She reports a long-standing (>5 year) history of sensitivity in the nipple-areolar region. She feels relief without wearing a bra and reports fluctuating weight. She reports she was unable to find vitamin e/primrose oil, but feels improvement in her nipple sensitivity. She was re-measured for her bra size and went from a D to a B cup size.    She underwent annual bilateral mammogram/sonogram on 5/4/23 with benign findings, and no findings to explain her complaints of nipple sensitivity (BI-RADS 1).  She denies any family history of breast cancer.  Her PMH includes anxiety, migraines, HTN, GERD. Denies any breast biopsies.

## 2023-09-06 NOTE — ADDENDUM
[FreeTextEntry1] : I, Anjali Lentz, acted solely as a scribe for Dr. Kt Wilson on this date 09/06/2023.

## 2023-09-06 NOTE — PHYSICAL EXAM
[Normal] : supple, no neck mass and thyroid not enlarged [Normal Neck Lymph Nodes] : normal neck lymph nodes  [Normal Supraclavicular Lymph Nodes] : normal supraclavicular lymph nodes [Normal Axillary Lymph Nodes] : normal axillary lymph nodes [Normal] : oriented to person, place and time, with appropriate affect [de-identified] : mild chronic bilateral nipple inversion.  no masses or adenopathy bilaterally.

## 2023-09-07 ENCOUNTER — APPOINTMENT (OUTPATIENT)
Dept: CARDIOLOGY | Facility: CLINIC | Age: 58
End: 2023-09-07

## 2023-09-20 ENCOUNTER — NON-APPOINTMENT (OUTPATIENT)
Age: 58
End: 2023-09-20

## 2023-09-30 PROBLEM — N64.4 NIPPLE PAIN: Status: ACTIVE | Noted: 2023-06-05

## 2023-11-30 ENCOUNTER — APPOINTMENT (OUTPATIENT)
Dept: CARDIOLOGY | Facility: CLINIC | Age: 58
End: 2023-11-30

## 2023-12-28 ENCOUNTER — APPOINTMENT (OUTPATIENT)
Dept: ORTHOPEDIC SURGERY | Facility: CLINIC | Age: 58
End: 2023-12-28
Payer: COMMERCIAL

## 2023-12-28 VITALS — BODY MASS INDEX: 28.22 KG/M2 | HEIGHT: 59 IN | WEIGHT: 140 LBS

## 2023-12-28 PROCEDURE — 72050 X-RAY EXAM NECK SPINE 4/5VWS: CPT

## 2023-12-28 PROCEDURE — 99203 OFFICE O/P NEW LOW 30 MIN: CPT

## 2023-12-28 RX ORDER — DICLOFENAC SODIUM 1% 10 MG/G
1 GEL TOPICAL DAILY
Qty: 1 | Refills: 1 | Status: ACTIVE | COMMUNITY
Start: 2023-12-28 | End: 1900-01-01

## 2024-01-01 NOTE — HISTORY OF PRESENT ILLNESS
[Neck] : neck [3] : 3 [Dull/Aching] : dull/aching [Localized] : localized [Constant] : constant [Sleep] : sleep [Nothing helps with pain getting better] : Nothing helps with pain getting better [de-identified] : 59 yo F presenting with neck pain x 1 month, no injury. Pain localized around the L paraspinal/trap region, at times, shoots into the L periscapular region. 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 Changed positions which has helped. No arm pain. No n/t. Tried Tylenol with partial relief. Cervical aches and pain here and there but no prior w/u or treatments. No fine motor issues. No gait/balance issues. On an improving trend. denies f/chills/nocturnal awakening.   unable to take nsaids d/t gastric ulcer. Hx +SABINA.  [] : no [FreeTextEntry5] : 59 yo F presenting with neck pain that started over 1 month ago. No radiating pain.

## 2024-01-01 NOTE — IMAGING
[Facet arthropathy] : Facet arthropathy [de-identified] : L>R paracervical, trap and rhomboid tenderness and spasms   pain with flexion stiffness with extension full ROM with mild stiffness  motor exam 5/5 strength bilaterally sensory intact negative hoffmans negative spurlings [FreeTextEntry1] : spondylosis, significant loss of disc height at C5-6

## 2024-01-01 NOTE — ASSESSMENT
[FreeTextEntry1] : 59 yo F presenting with acute neck pain, without UE radiculopathy. XRs with significant loss of disc height at C5-6, diffuse spondylosis. On an improving trend. Will start PT. Discussed topical nsaids and muscle relaxant prn. Unable to take OTC nsaids d/t GI ulcer hx. F/u in 1 month, if symptoms persist will get MRI CS.  Progress note completed by Lily Fierro PA-C

## 2024-01-11 ENCOUNTER — APPOINTMENT (OUTPATIENT)
Dept: RHEUMATOLOGY | Facility: CLINIC | Age: 59
End: 2024-01-11
Payer: COMMERCIAL

## 2024-01-11 VITALS
OXYGEN SATURATION: 98 % | WEIGHT: 136 LBS | SYSTOLIC BLOOD PRESSURE: 135 MMHG | HEIGHT: 59 IN | DIASTOLIC BLOOD PRESSURE: 80 MMHG | HEART RATE: 83 BPM | BODY MASS INDEX: 27.42 KG/M2

## 2024-01-11 DIAGNOSIS — M79.10 MYALGIA, UNSPECIFIED SITE: ICD-10-CM

## 2024-01-11 DIAGNOSIS — R76.8 OTHER SPECIFIED ABNORMAL IMMUNOLOGICAL FINDINGS IN SERUM: ICD-10-CM

## 2024-01-11 DIAGNOSIS — M79.605 PAIN IN LEFT LEG: ICD-10-CM

## 2024-01-11 LAB
BASOPHILS # BLD AUTO: 0.04 K/UL
BASOPHILS NFR BLD AUTO: 0.7 %
EOSINOPHIL # BLD AUTO: 0.11 K/UL
EOSINOPHIL NFR BLD AUTO: 1.8 %
HCT VFR BLD CALC: 37.7 %
HGB BLD-MCNC: 12.7 G/DL
IMM GRANULOCYTES NFR BLD AUTO: 0.5 %
LYMPHOCYTES # BLD AUTO: 2.57 K/UL
LYMPHOCYTES NFR BLD AUTO: 43.2 %
MAN DIFF?: NORMAL
MCHC RBC-ENTMCNC: 31 PG
MCHC RBC-ENTMCNC: 33.7 GM/DL
MCV RBC AUTO: 92 FL
MONOCYTES # BLD AUTO: 0.43 K/UL
MONOCYTES NFR BLD AUTO: 7.2 %
NEUTROPHILS # BLD AUTO: 2.77 K/UL
NEUTROPHILS NFR BLD AUTO: 46.6 %
PLATELET # BLD AUTO: 328 K/UL
RBC # BLD: 4.1 M/UL
RBC # FLD: 12.5 %
WBC # FLD AUTO: 5.95 K/UL

## 2024-01-11 PROCEDURE — 99214 OFFICE O/P EST MOD 30 MIN: CPT

## 2024-01-11 NOTE — ASSESSMENT
[FreeTextEntry1] : duplex to rule out DVT left LE  repeat serology given positive SABINA Flexeril as needed for myalgia ? FMS related myalgias RNA polymerase III positive--no symptoms suggestive of scleroderma; repeat serology  OV 1 year, sooner PRN

## 2024-01-11 NOTE — HISTORY OF PRESENT ILLNESS
[FreeTextEntry1] : Patient was having myalgia before and was taking off Lipitor and she is crestor now. Overall myalgias are better but once in a while she has diffuse body ache. She was wondering if she had blood work done at the time of flare.  Dx with cervical arthritis and started on PT last week. She was also given flexeril. Flexeril helps with mm pain.  Pain is worse when she is under stress/anxious or under the weather.  No fever, rash, or recent illness.  No joint pain/swelling/stiffness.  No eye pain/redness/change in vision.  No sores in the mouth or nose.  No difficulty swallowing.  No chest pain or shortness of breath.  No abdominal complaints or weight loss.  No weakness.  No headaches or focal neurological deficits.  No urinary changes.  No other new symptoms.

## 2024-01-11 NOTE — PHYSICAL EXAM
[General Appearance - Alert] : alert [General Appearance - In No Acute Distress] : in no acute distress [FreeTextEntry1] : left lower extremity swelling [Abnormal Walk] : normal gait [Nail Clubbing] : no clubbing  or cyanosis of the fingernails [Musculoskeletal - Swelling] : no joint swelling seen [Motor Tone] : muscle strength and tone were normal [Oriented To Time, Place, And Person] : oriented to person, place, and time [Impaired Insight] : insight and judgment were intact [Affect] : the affect was normal

## 2024-01-12 LAB
ALBUMIN SERPL ELPH-MCNC: 5.1 G/DL
ALP BLD-CCNC: 86 U/L
ALT SERPL-CCNC: 47 U/L
ANION GAP SERPL CALC-SCNC: 11 MMOL/L
APPEARANCE: ABNORMAL
AST SERPL-CCNC: 45 U/L
BACTERIA: NEGATIVE /HPF
BILIRUB SERPL-MCNC: 0.4 MG/DL
BILIRUBIN URINE: NEGATIVE
BLOOD URINE: NEGATIVE
BUN SERPL-MCNC: 16 MG/DL
CALCIUM SERPL-MCNC: 11 MG/DL
CAST: 0 /LPF
CHLORIDE SERPL-SCNC: 101 MMOL/L
CK SERPL-CCNC: 262 U/L
CO2 SERPL-SCNC: 27 MMOL/L
COLOR: YELLOW
CREAT SERPL-MCNC: 0.93 MG/DL
CREAT SPEC-SCNC: 79 MG/DL
CREAT/PROT UR: 0.1 RATIO
EGFR: 71 ML/MIN/1.73M2
EPITHELIAL CELLS: 0 /HPF
GLUCOSE QUALITATIVE U: NEGATIVE MG/DL
GLUCOSE SERPL-MCNC: 118 MG/DL
KETONES URINE: NEGATIVE MG/DL
LEUKOCYTE ESTERASE URINE: NEGATIVE
MICROSCOPIC-UA: NORMAL
NITRITE URINE: NEGATIVE
PH URINE: 7.5
POTASSIUM SERPL-SCNC: 3.6 MMOL/L
PROT SERPL-MCNC: 7.4 G/DL
PROT UR-MCNC: 6 MG/DL
PROTEIN URINE: NEGATIVE MG/DL
RED BLOOD CELLS URINE: 1 /HPF
SODIUM SERPL-SCNC: 139 MMOL/L
SPECIFIC GRAVITY URINE: 1.02
UROBILINOGEN URINE: 0.2 MG/DL
WHITE BLOOD CELLS URINE: 1 /HPF

## 2024-01-18 ENCOUNTER — OUTPATIENT (OUTPATIENT)
Dept: OUTPATIENT SERVICES | Facility: HOSPITAL | Age: 59
LOS: 1 days | End: 2024-01-18
Payer: COMMERCIAL

## 2024-01-18 ENCOUNTER — APPOINTMENT (OUTPATIENT)
Dept: ULTRASOUND IMAGING | Facility: CLINIC | Age: 59
End: 2024-01-18
Payer: COMMERCIAL

## 2024-01-18 DIAGNOSIS — Z00.8 ENCOUNTER FOR OTHER GENERAL EXAMINATION: ICD-10-CM

## 2024-01-18 PROCEDURE — 93971 EXTREMITY STUDY: CPT | Mod: 26,LT

## 2024-01-18 PROCEDURE — 93971 EXTREMITY STUDY: CPT

## 2024-02-01 ENCOUNTER — APPOINTMENT (OUTPATIENT)
Dept: CARDIOLOGY | Facility: CLINIC | Age: 59
End: 2024-02-01
Payer: COMMERCIAL

## 2024-02-01 ENCOUNTER — NON-APPOINTMENT (OUTPATIENT)
Age: 59
End: 2024-02-01

## 2024-02-01 VITALS
HEIGHT: 59 IN | OXYGEN SATURATION: 98 % | HEART RATE: 95 BPM | WEIGHT: 134 LBS | SYSTOLIC BLOOD PRESSURE: 149 MMHG | BODY MASS INDEX: 27.01 KG/M2 | DIASTOLIC BLOOD PRESSURE: 91 MMHG

## 2024-02-01 VITALS — DIASTOLIC BLOOD PRESSURE: 78 MMHG | SYSTOLIC BLOOD PRESSURE: 126 MMHG

## 2024-02-01 DIAGNOSIS — R94.31 ABNORMAL ELECTROCARDIOGRAM [ECG] [EKG]: ICD-10-CM

## 2024-02-01 DIAGNOSIS — I65.23 OCCLUSION AND STENOSIS OF BILATERAL CAROTID ARTERIES: ICD-10-CM

## 2024-02-01 DIAGNOSIS — R55 SYNCOPE AND COLLAPSE: ICD-10-CM

## 2024-02-01 PROCEDURE — 99214 OFFICE O/P EST MOD 30 MIN: CPT

## 2024-02-01 PROCEDURE — G2211 COMPLEX E/M VISIT ADD ON: CPT

## 2024-02-01 PROCEDURE — 93000 ELECTROCARDIOGRAM COMPLETE: CPT

## 2024-02-01 NOTE — CARDIOLOGY SUMMARY
[de-identified] : Sinus Rhythm  -Anteroseptal infarct -age undetermined. nonspecific ST changes [de-identified] : 11/8/22 TMET 7 METs fair ET neg ischemia  [de-identified] : 10/2022 nroaml LV function. mild AS  [de-identified] : 10/27/22 no carotid disease.

## 2024-02-01 NOTE — HISTORY OF PRESENT ILLNESS
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   She was last here in 12/2022 Since her last visit, she has been more stressed with her moms health. She also has been having issues at work which has caused her more stressed. She was speaking to her friend about an incident, and she was very emotional and started to crying. All of a sudden her "her mind went blank", she slumped over. She states that she was able to hear her friend. She  denies any full LOC. Her symptoms have not reoccurred.  She has been having more issues with her neck and going to PT. She has been more sedentary than previous. She   denies any chest pain, PND, orthopnea, lower extremity edema, near syncope, syncope, palpitations, stroke like symptoms.  Medication reconciliation performed. She is compliant with her medications.

## 2024-02-01 NOTE — DISCUSSION/SUMMARY
[FreeTextEntry1] : 58 year woman with a history as listed presents for a followup cardiac evaluation.  Mora recently had a near syncopal episode that was likely from increased vagal tone.  Her EKG is unchanged from previous. Given her arthritic issues and abnormal EKG, She will get a 640 slice cardiac CT to define her coronary anatomy and to rule out significant CAD. She will get a 2d echo to rule out underlying structural heart disease. She will get a Zio Patch to rule out arrhythmias.  Her blood pressure is elevated. 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 She will continue Chlorthalidone 25mg qday. Reducing dietary salt intake advised.  She has myalgias still from statin. Her cpks were mildly elevated. Stop the crestor. She will continue Tricor. Exercise and diet counseling was performed in order to reduce her future cardiovascular risk.  She will followup with me in 3 months  months or sooner if necessary.  [EKG obtained to assist in diagnosis and management of assessed problem(s)] : EKG obtained to assist in diagnosis and management of assessed problem(s)

## 2024-02-12 ENCOUNTER — APPOINTMENT (OUTPATIENT)
Dept: ORTHOPEDIC SURGERY | Facility: CLINIC | Age: 59
End: 2024-02-12

## 2024-02-12 ENCOUNTER — NON-APPOINTMENT (OUTPATIENT)
Age: 59
End: 2024-02-12

## 2024-02-14 ENCOUNTER — OUTPATIENT (OUTPATIENT)
Dept: OUTPATIENT SERVICES | Facility: HOSPITAL | Age: 59
LOS: 1 days | End: 2024-02-14
Payer: COMMERCIAL

## 2024-02-14 ENCOUNTER — APPOINTMENT (OUTPATIENT)
Dept: MRI IMAGING | Facility: CLINIC | Age: 59
End: 2024-02-14
Payer: COMMERCIAL

## 2024-02-14 ENCOUNTER — RESULT REVIEW (OUTPATIENT)
Age: 59
End: 2024-02-14

## 2024-02-14 DIAGNOSIS — M62.838 OTHER MUSCLE SPASM: ICD-10-CM

## 2024-02-14 PROCEDURE — 72141 MRI NECK SPINE W/O DYE: CPT | Mod: 26

## 2024-02-14 PROCEDURE — 72141 MRI NECK SPINE W/O DYE: CPT

## 2024-02-15 ENCOUNTER — APPOINTMENT (OUTPATIENT)
Dept: CARDIOLOGY | Facility: CLINIC | Age: 59
End: 2024-02-15
Payer: COMMERCIAL

## 2024-02-15 PROCEDURE — 93306 TTE W/DOPPLER COMPLETE: CPT

## 2024-02-21 LAB
ANA PAT FLD IF-IMP: ABNORMAL
ANA SER IF-ACNC: ABNORMAL
B2 GLYCOPROT1 AB SER QL: NEGATIVE
CARDIOLIPIN AB SER IA-ACNC: NEGATIVE
CENTROMERE IGG SER-ACNC: <0.2 AL
DSDNA AB SER-ACNC: <12 IU/ML
EJ AB SER QL: NEGATIVE
ENA JO1 AB SER IA-ACNC: <20 UNITS
ENA PM/SCL AB SER-ACNC: <20 UNITS
ENA RNP AB SER IA-ACNC: <0.2 AL
ENA SCL70 IGG SER IA-ACNC: <0.2 AL
ENA SM AB SER IA-ACNC: <0.2 AL
ENA SM+RNP AB SER IA-ACNC: <20 UNITS
ENA SS-A AB SER IA-ACNC: <0.2 AL
ENA SS-A IGG SER QL: 35 UNITS
ENA SS-B AB SER IA-ACNC: <0.2 AL
FIBRILLARIN AB SER QL: NEGATIVE
KU AB SER QL: NEGATIVE
MDA-5 (P140)(CADM-140): <20 UNITS
MI2 AB SER QL: NEGATIVE
NXP-2 (P140): <20 UNITS
OJ AB SER QL: NEGATIVE
PL12 AB SER QL: NEGATIVE
PL7 AB SER QL: NEGATIVE
RNA POLYMERASE III IGG: 26 UNITS
SRP AB SERPL QL: NEGATIVE
TIF GAMMA (P155/140): <20 UNITS
U2 SNRNP AB SER QL: NEGATIVE

## 2024-02-29 ENCOUNTER — APPOINTMENT (OUTPATIENT)
Dept: ORTHOPEDIC SURGERY | Facility: CLINIC | Age: 59
End: 2024-02-29
Payer: COMMERCIAL

## 2024-02-29 DIAGNOSIS — M62.838 OTHER MUSCLE SPASM: ICD-10-CM

## 2024-02-29 DIAGNOSIS — M47.812 SPONDYLOSIS W/OUT MYELOPATHY OR RADICULOPATHY, CERVICAL REGION: ICD-10-CM

## 2024-02-29 PROCEDURE — 99213 OFFICE O/P EST LOW 20 MIN: CPT

## 2024-02-29 RX ORDER — CYCLOBENZAPRINE HYDROCHLORIDE 5 MG/1
5 TABLET, FILM COATED ORAL
Qty: 30 | Refills: 0 | Status: ACTIVE | COMMUNITY
Start: 2023-12-28 | End: 1900-01-01

## 2024-02-29 NOTE — IMAGING
[Facet arthropathy] : Facet arthropathy [de-identified] : L>R paracervical, trap and rhomboid tenderness and spasms   pain with flexion stiffness with extension full ROM with mild stiffness  motor exam 5/5 strength bilaterally sensory intact negative hoffmans negative spurlings [FreeTextEntry1] : spondylosis, significant loss of disc height at C5-6

## 2024-02-29 NOTE — HISTORY OF PRESENT ILLNESS
[de-identified] : 02/29/24: Follow up [facility]. Attending PT with good results, states persisting spasm in left side. MRI review.    12/28/23: 57 yo F presenting with neck pain x 1 month, no injury. Pain localized around the L paraspinal/trap region, at times, shoots into the L periscapular region. 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 Changed positions which has helped. No arm pain. No n/t. Tried Tylenol with partial relief. Cervical aches and pain here and there but no prior w/u or treatments. No fine motor issues. No gait/balance issues. On an improving trend. denies f/chills/nocturnal awakening.   unable to take nsaids d/t gastric ulcer. Hx +SABINA.

## 2024-02-29 NOTE — DATA REVIEWED
[MRI] : MRI [Cervical Spine] : cervical spine [I independently reviewed and interpreted images and report] : I independently reviewed and interpreted images and report [Report was reviewed and noted in the chart] : The report was reviewed and noted in the chart [FreeTextEntry1] : @[facility] Spine MRI 1. C5-6 loss of disc height, with moderate right foraminal stenosis, contacting R C6.

## 2024-02-29 NOTE — ASSESSMENT
[FreeTextEntry1] : 59 yo F presenting with acute neck pain, without UE radiculopathy. MRI shows moderate-severe left foraminal stenosis at C5-6. Has completed 6 weeks of PT with good results. Continues w/o radiculopathy.   - C/w HEP to address residual spasms.  - Renewed muscle relaxers today . - If she would like to resume PT, will give us a call and will provide rx.  - Discussed if symptoms worsen will consider pain mgmt.  - F/up as symptoms dictate.

## 2024-03-21 ENCOUNTER — APPOINTMENT (OUTPATIENT)
Dept: CT IMAGING | Facility: CLINIC | Age: 59
End: 2024-03-21
Payer: COMMERCIAL

## 2024-03-21 ENCOUNTER — OUTPATIENT (OUTPATIENT)
Dept: OUTPATIENT SERVICES | Facility: HOSPITAL | Age: 59
LOS: 1 days | End: 2024-03-21
Payer: COMMERCIAL

## 2024-03-21 DIAGNOSIS — R94.31 ABNORMAL ELECTROCARDIOGRAM [ECG] [EKG]: ICD-10-CM

## 2024-03-21 PROCEDURE — 75574 CT ANGIO HRT W/3D IMAGE: CPT | Mod: 26

## 2024-03-21 PROCEDURE — 75574 CT ANGIO HRT W/3D IMAGE: CPT

## 2024-04-18 ENCOUNTER — APPOINTMENT (OUTPATIENT)
Dept: RHEUMATOLOGY | Facility: CLINIC | Age: 59
End: 2024-04-18
Payer: COMMERCIAL

## 2024-04-18 VITALS
BODY MASS INDEX: 25 KG/M2 | OXYGEN SATURATION: 98 % | SYSTOLIC BLOOD PRESSURE: 136 MMHG | DIASTOLIC BLOOD PRESSURE: 77 MMHG | WEIGHT: 124 LBS | HEIGHT: 59 IN | HEART RATE: 73 BPM

## 2024-04-18 DIAGNOSIS — R21 RASH AND OTHER NONSPECIFIC SKIN ERUPTION: ICD-10-CM

## 2024-04-18 DIAGNOSIS — R74.8 ABNORMAL LEVELS OF OTHER SERUM ENZYMES: ICD-10-CM

## 2024-04-18 PROCEDURE — 99214 OFFICE O/P EST MOD 30 MIN: CPT

## 2024-04-18 RX ORDER — CLOTRIMAZOLE AND BETAMETHASONE DIPROPIONATE 10; .5 MG/G; MG/G
1-0.05 CREAM TOPICAL TWICE DAILY
Qty: 1 | Refills: 0 | Status: ACTIVE | COMMUNITY
Start: 2024-04-18 | End: 1900-01-01

## 2024-04-21 LAB
ALBUMIN SERPL ELPH-MCNC: 4.7 G/DL
ALP BLD-CCNC: 87 U/L
ALT SERPL-CCNC: 34 U/L
ANION GAP SERPL CALC-SCNC: 13 MMOL/L
AST SERPL-CCNC: 31 U/L
BILIRUB SERPL-MCNC: 0.4 MG/DL
BUN SERPL-MCNC: 15 MG/DL
CALCIUM SERPL-MCNC: 10.5 MG/DL
CHLORIDE SERPL-SCNC: 102 MMOL/L
CK SERPL-CCNC: 173 U/L
CO2 SERPL-SCNC: 26 MMOL/L
CREAT SERPL-MCNC: 0.88 MG/DL
EGFR: 76 ML/MIN/1.73M2
POTASSIUM SERPL-SCNC: 3.9 MMOL/L
PROT SERPL-MCNC: 7.6 G/DL
SODIUM SERPL-SCNC: 141 MMOL/L

## 2024-05-02 ENCOUNTER — APPOINTMENT (OUTPATIENT)
Dept: CARDIOLOGY | Facility: CLINIC | Age: 59
End: 2024-05-02
Payer: COMMERCIAL

## 2024-05-02 ENCOUNTER — NON-APPOINTMENT (OUTPATIENT)
Age: 59
End: 2024-05-02

## 2024-05-02 VITALS
DIASTOLIC BLOOD PRESSURE: 75 MMHG | HEART RATE: 67 BPM | WEIGHT: 126 LBS | OXYGEN SATURATION: 99 % | HEIGHT: 59 IN | SYSTOLIC BLOOD PRESSURE: 114 MMHG | BODY MASS INDEX: 25.4 KG/M2

## 2024-05-02 DIAGNOSIS — I25.10 ATHEROSCLEROTIC HEART DISEASE OF NATIVE CORONARY ARTERY W/OUT ANGINA PECTORIS: ICD-10-CM

## 2024-05-02 DIAGNOSIS — E78.5 HYPERLIPIDEMIA, UNSPECIFIED: ICD-10-CM

## 2024-05-02 DIAGNOSIS — I10 ESSENTIAL (PRIMARY) HYPERTENSION: ICD-10-CM

## 2024-05-02 PROCEDURE — G2211 COMPLEX E/M VISIT ADD ON: CPT

## 2024-05-02 PROCEDURE — 99214 OFFICE O/P EST MOD 30 MIN: CPT

## 2024-05-02 PROCEDURE — 93000 ELECTROCARDIOGRAM COMPLETE: CPT

## 2024-05-02 RX ORDER — PRAVASTATIN SODIUM 20 MG/1
20 TABLET ORAL
Qty: 90 | Refills: 3 | Status: ACTIVE | COMMUNITY
Start: 2024-05-02 | End: 1900-01-01

## 2024-05-02 RX ORDER — ROSUVASTATIN CALCIUM 10 MG/1
10 TABLET, FILM COATED ORAL
Qty: 90 | Refills: 2 | Status: DISCONTINUED | COMMUNITY
Start: 2023-01-31 | End: 2024-05-02

## 2024-05-02 NOTE — HISTORY OF PRESENT ILLNESS
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   Since her last visit,  her myalgias have improved since being off the crestor. She has been maintaining a better diet. Now a Mediterranean diet.  She denies any chest pain. She   denies any chest pain, PND, orthopnea, lower extremity edema, near syncope, syncope, palpitations, stroke like symptoms.  Medication reconciliation performed. She is compliant with her medications.

## 2024-05-02 NOTE — CARDIOLOGY SUMMARY
[de-identified] : Wandering atrial pacemaker [de-identified] : 2/2024 sr [de-identified] : 11/8/22 TMET 7 METs fair ET neg ischemia  [de-identified] : 10/2022 nroaml LV function. mild AS  2/2024 normal Function. moderate AS [de-identified] : 3/2024 Coronary CTA: Agatston calcium score: 21. ESCOBAR: 79th percentile.  Left coronary artery dominance. No significant moderate to severe category coronary artery stenosis. Scattered minimal category disease secondary to noncalcified plaque. Left main coronary artery ostial calcified plaque without measurable stenosis. [de-identified] : 10/27/22 no carotid disease.

## 2024-05-02 NOTE — DISCUSSION/SUMMARY
[FreeTextEntry1] : 58 year woman with a history as listed presents for a followup cardiac evaluation.  Mora has been feeling better since being off othe crestor. She has mild nonobstructive CAD based on her CTA cors. I would consider starting Pravastatin 20mg Qday. Recheck CPK and lipids 3 months. She will followup rheum.  She will continue Tricor. Her blood pressure is elevated. She will continue on Chlorthalidone for her calcinuria. She will continue Chlorthalidone 25mg qday. Reducing dietary salt intake advised.  She has moderate AS on echo but her exam is more consistent with mild. repeat echo in 1 year.  Exercise and diet counseling was performed in order to reduce her future cardiovascular risk.  She will followup with me in 6 months  or sooner if necessary.  [EKG obtained to assist in diagnosis and management of assessed problem(s)] : EKG obtained to assist in diagnosis and management of assessed problem(s)

## 2024-05-09 ENCOUNTER — APPOINTMENT (OUTPATIENT)
Dept: ULTRASOUND IMAGING | Facility: CLINIC | Age: 59
End: 2024-05-09
Payer: COMMERCIAL

## 2024-05-09 ENCOUNTER — APPOINTMENT (OUTPATIENT)
Dept: MAMMOGRAPHY | Facility: CLINIC | Age: 59
End: 2024-05-09
Payer: COMMERCIAL

## 2024-05-09 ENCOUNTER — OUTPATIENT (OUTPATIENT)
Dept: OUTPATIENT SERVICES | Facility: HOSPITAL | Age: 59
LOS: 1 days | End: 2024-05-09
Payer: COMMERCIAL

## 2024-05-09 DIAGNOSIS — Z12.31 ENCOUNTER FOR SCREENING MAMMOGRAM FOR MALIGNANT NEOPLASM OF BREAST: ICD-10-CM

## 2024-05-09 DIAGNOSIS — N60.19 DIFFUSE CYSTIC MASTOPATHY OF UNSPECIFIED BREAST: ICD-10-CM

## 2024-05-09 PROCEDURE — 76641 ULTRASOUND BREAST COMPLETE: CPT | Mod: 26,50

## 2024-05-09 PROCEDURE — 76641 ULTRASOUND BREAST COMPLETE: CPT

## 2024-05-09 PROCEDURE — 77067 SCR MAMMO BI INCL CAD: CPT | Mod: 26

## 2024-05-09 PROCEDURE — 77063 BREAST TOMOSYNTHESIS BI: CPT | Mod: 26

## 2024-05-09 PROCEDURE — 77067 SCR MAMMO BI INCL CAD: CPT

## 2024-05-09 PROCEDURE — 77063 BREAST TOMOSYNTHESIS BI: CPT

## 2024-05-22 ENCOUNTER — APPOINTMENT (OUTPATIENT)
Dept: SURGICAL ONCOLOGY | Facility: CLINIC | Age: 59
End: 2024-05-22

## 2024-05-23 LAB
EJ AB SER QL: NEGATIVE
ENA JO1 AB SER IA-ACNC: <20 UNITS
ENA PM/SCL AB SER-ACNC: <20 UNITS
ENA SM+RNP AB SER IA-ACNC: <20 UNITS
ENA SS-A IGG SER QL: 26 UNITS
FIBRILLARIN AB SER QL: NEGATIVE
KU AB SER QL: NEGATIVE
MDA-5 (P140)(CADM-140): <20 UNITS
MI2 AB SER QL: NEGATIVE
NXP-2 (P140): <20 UNITS
OJ AB SER QL: NEGATIVE
PL12 AB SER QL: NEGATIVE
PL7 AB SER QL: NEGATIVE
SRP AB SERPL QL: NEGATIVE
TIF GAMMA (P155/140): <20 UNITS
U2 SNRNP AB SER QL: NEGATIVE

## 2024-07-11 ENCOUNTER — APPOINTMENT (OUTPATIENT)
Dept: INTERNAL MEDICINE | Facility: CLINIC | Age: 59
End: 2024-07-11

## 2024-09-05 ENCOUNTER — APPOINTMENT (OUTPATIENT)
Dept: RHEUMATOLOGY | Facility: CLINIC | Age: 59
End: 2024-09-05

## 2024-09-12 ENCOUNTER — APPOINTMENT (OUTPATIENT)
Dept: OPHTHALMOLOGY | Facility: CLINIC | Age: 59
End: 2024-09-12

## 2024-11-13 ENCOUNTER — APPOINTMENT (OUTPATIENT)
Dept: PHARMACY | Facility: CLINIC | Age: 59
End: 2024-11-13
Payer: SELF-PAY

## 2024-11-13 PROCEDURE — V5299A: CUSTOM

## 2024-11-14 ENCOUNTER — APPOINTMENT (OUTPATIENT)
Dept: CARDIOLOGY | Facility: CLINIC | Age: 59
End: 2024-11-14

## 2025-01-16 ENCOUNTER — NON-APPOINTMENT (OUTPATIENT)
Age: 60
End: 2025-01-16

## 2025-01-16 ENCOUNTER — APPOINTMENT (OUTPATIENT)
Dept: CARDIOLOGY | Facility: CLINIC | Age: 60
End: 2025-01-16
Payer: COMMERCIAL

## 2025-01-16 VITALS
HEART RATE: 77 BPM | OXYGEN SATURATION: 99 % | DIASTOLIC BLOOD PRESSURE: 85 MMHG | HEIGHT: 59 IN | BODY MASS INDEX: 26.61 KG/M2 | WEIGHT: 132 LBS | SYSTOLIC BLOOD PRESSURE: 134 MMHG

## 2025-01-16 DIAGNOSIS — E78.5 HYPERLIPIDEMIA, UNSPECIFIED: ICD-10-CM

## 2025-01-16 DIAGNOSIS — I35.0 NONRHEUMATIC AORTIC (VALVE) STENOSIS: ICD-10-CM

## 2025-01-16 DIAGNOSIS — R01.1 CARDIAC MURMUR, UNSPECIFIED: ICD-10-CM

## 2025-01-16 DIAGNOSIS — I10 ESSENTIAL (PRIMARY) HYPERTENSION: ICD-10-CM

## 2025-01-16 PROCEDURE — 93000 ELECTROCARDIOGRAM COMPLETE: CPT

## 2025-01-16 PROCEDURE — 99214 OFFICE O/P EST MOD 30 MIN: CPT

## 2025-01-16 PROCEDURE — G2211 COMPLEX E/M VISIT ADD ON: CPT

## 2025-01-30 ENCOUNTER — APPOINTMENT (OUTPATIENT)
Dept: CARDIOLOGY | Facility: CLINIC | Age: 60
End: 2025-01-30
Payer: COMMERCIAL

## 2025-01-30 VITALS — DIASTOLIC BLOOD PRESSURE: 92 MMHG | SYSTOLIC BLOOD PRESSURE: 146 MMHG | HEART RATE: 73 BPM

## 2025-01-30 PROCEDURE — 99211 OFF/OP EST MAY X REQ PHY/QHP: CPT

## 2025-01-30 PROCEDURE — 93306 TTE W/DOPPLER COMPLETE: CPT

## 2025-02-04 RX ORDER — METOPROLOL SUCCINATE 50 MG/1
50 TABLET, EXTENDED RELEASE ORAL AT BEDTIME
Qty: 30 | Refills: 0 | Status: ACTIVE | COMMUNITY
Start: 2025-02-03

## 2025-02-13 ENCOUNTER — OUTPATIENT (OUTPATIENT)
Dept: OUTPATIENT SERVICES | Facility: HOSPITAL | Age: 60
LOS: 1 days | End: 2025-02-13
Payer: COMMERCIAL

## 2025-02-13 ENCOUNTER — APPOINTMENT (OUTPATIENT)
Dept: ULTRASOUND IMAGING | Facility: CLINIC | Age: 60
End: 2025-02-13
Payer: COMMERCIAL

## 2025-02-13 DIAGNOSIS — Z00.8 ENCOUNTER FOR OTHER GENERAL EXAMINATION: ICD-10-CM

## 2025-02-13 DIAGNOSIS — R10.12 LEFT UPPER QUADRANT PAIN: ICD-10-CM

## 2025-02-13 PROCEDURE — 76700 US EXAM ABDOM COMPLETE: CPT | Mod: 26

## 2025-02-13 PROCEDURE — 76700 US EXAM ABDOM COMPLETE: CPT

## 2025-02-19 ENCOUNTER — EMERGENCY (EMERGENCY)
Facility: HOSPITAL | Age: 60
LOS: 1 days | Discharge: HOME | End: 2025-02-19
Attending: EMERGENCY MEDICINE
Payer: COMMERCIAL

## 2025-02-19 VITALS
DIASTOLIC BLOOD PRESSURE: 84 MMHG | TEMPERATURE: 99 F | RESPIRATION RATE: 18 BRPM | HEIGHT: 59 IN | WEIGHT: 113.1 LBS | SYSTOLIC BLOOD PRESSURE: 165 MMHG | OXYGEN SATURATION: 99 % | HEART RATE: 92 BPM

## 2025-02-19 PROCEDURE — 90471 IMMUNIZATION ADMIN: CPT

## 2025-02-19 PROCEDURE — 99283 EMERGENCY DEPT VISIT LOW MDM: CPT | Mod: 25

## 2025-02-19 PROCEDURE — 99284 EMERGENCY DEPT VISIT MOD MDM: CPT

## 2025-02-19 PROCEDURE — 90715 TDAP VACCINE 7 YRS/> IM: CPT

## 2025-02-19 RX ORDER — ALBUMIN (HUMAN) 12.5 G/50ML
50 INJECTION, SOLUTION INTRAVENOUS ONCE
Refills: 0 | Status: DISCONTINUED | OUTPATIENT
Start: 2025-02-19 | End: 2025-02-19

## 2025-03-27 ENCOUNTER — APPOINTMENT (OUTPATIENT)
Dept: CT IMAGING | Facility: CLINIC | Age: 60
End: 2025-03-27

## 2025-03-27 ENCOUNTER — OUTPATIENT (OUTPATIENT)
Dept: OUTPATIENT SERVICES | Facility: HOSPITAL | Age: 60
LOS: 1 days | End: 2025-03-27
Payer: COMMERCIAL

## 2025-03-27 DIAGNOSIS — J32.8 OTHER CHRONIC SINUSITIS: ICD-10-CM

## 2025-03-27 PROCEDURE — 70486 CT MAXILLOFACIAL W/O DYE: CPT | Mod: 26

## 2025-03-27 PROCEDURE — 70486 CT MAXILLOFACIAL W/O DYE: CPT

## 2025-04-30 ENCOUNTER — OUTPATIENT (OUTPATIENT)
Dept: OUTPATIENT SERVICES | Facility: HOSPITAL | Age: 60
LOS: 1 days | End: 2025-04-30
Payer: COMMERCIAL

## 2025-04-30 ENCOUNTER — APPOINTMENT (OUTPATIENT)
Dept: MRI IMAGING | Facility: CLINIC | Age: 60
End: 2025-04-30
Payer: COMMERCIAL

## 2025-04-30 DIAGNOSIS — Z00.00 ENCOUNTER FOR GENERAL ADULT MEDICAL EXAMINATION WITHOUT ABNORMAL FINDINGS: ICD-10-CM

## 2025-04-30 PROCEDURE — 73721 MRI JNT OF LWR EXTRE W/O DYE: CPT

## 2025-04-30 PROCEDURE — 73721 MRI JNT OF LWR EXTRE W/O DYE: CPT | Mod: 26,RT

## 2025-05-15 ENCOUNTER — APPOINTMENT (OUTPATIENT)
Dept: MAMMOGRAPHY | Facility: CLINIC | Age: 60
End: 2025-05-15
Payer: COMMERCIAL

## 2025-05-15 ENCOUNTER — APPOINTMENT (OUTPATIENT)
Dept: ULTRASOUND IMAGING | Facility: CLINIC | Age: 60
End: 2025-05-15
Payer: COMMERCIAL

## 2025-05-15 ENCOUNTER — OUTPATIENT (OUTPATIENT)
Dept: OUTPATIENT SERVICES | Facility: HOSPITAL | Age: 60
LOS: 1 days | End: 2025-05-15
Payer: COMMERCIAL

## 2025-05-15 DIAGNOSIS — Z00.00 ENCOUNTER FOR GENERAL ADULT MEDICAL EXAMINATION WITHOUT ABNORMAL FINDINGS: ICD-10-CM

## 2025-05-15 PROCEDURE — 76641 ULTRASOUND BREAST COMPLETE: CPT | Mod: 26,50

## 2025-05-15 PROCEDURE — 77063 BREAST TOMOSYNTHESIS BI: CPT | Mod: 26

## 2025-05-15 PROCEDURE — 76641 ULTRASOUND BREAST COMPLETE: CPT

## 2025-05-15 PROCEDURE — 77067 SCR MAMMO BI INCL CAD: CPT | Mod: 26

## 2025-05-15 PROCEDURE — 77067 SCR MAMMO BI INCL CAD: CPT

## 2025-05-15 PROCEDURE — 77063 BREAST TOMOSYNTHESIS BI: CPT

## 2025-06-26 ENCOUNTER — APPOINTMENT (OUTPATIENT)
Dept: ORTHOPEDIC SURGERY | Facility: CLINIC | Age: 60
End: 2025-06-26
Payer: COMMERCIAL

## 2025-06-26 VITALS — WEIGHT: 132 LBS | HEIGHT: 59 IN | BODY MASS INDEX: 26.61 KG/M2

## 2025-06-26 PROBLEM — M79.18 MUSCULOSKELETAL PAIN: Status: ACTIVE | Noted: 2025-06-26

## 2025-06-26 PROBLEM — M19.011 ARTHRITIS OF RIGHT ACROMIOCLAVICULAR JOINT: Status: ACTIVE | Noted: 2025-06-26

## 2025-06-26 PROBLEM — M75.41 IMPINGEMENT SYNDROME OF RIGHT SHOULDER: Status: ACTIVE | Noted: 2025-06-26

## 2025-06-26 PROCEDURE — 99214 OFFICE O/P EST MOD 30 MIN: CPT | Mod: 25

## 2025-06-26 PROCEDURE — J3490M: CUSTOM

## 2025-06-26 PROCEDURE — 73010 X-RAY EXAM OF SHOULDER BLADE: CPT | Mod: RT

## 2025-06-26 PROCEDURE — 20611 DRAIN/INJ JOINT/BURSA W/US: CPT | Mod: RT

## 2025-06-26 PROCEDURE — 73030 X-RAY EXAM OF SHOULDER: CPT | Mod: RT

## 2025-08-07 ENCOUNTER — APPOINTMENT (OUTPATIENT)
Dept: ORTHOPEDIC SURGERY | Facility: CLINIC | Age: 60
End: 2025-08-07
Payer: OTHER MISCELLANEOUS

## 2025-08-07 ENCOUNTER — APPOINTMENT (OUTPATIENT)
Dept: CARDIOLOGY | Facility: CLINIC | Age: 60
End: 2025-08-07
Payer: COMMERCIAL

## 2025-08-07 VITALS
WEIGHT: 143 LBS | BODY MASS INDEX: 28.83 KG/M2 | HEART RATE: 77 BPM | HEIGHT: 59 IN | OXYGEN SATURATION: 95 % | SYSTOLIC BLOOD PRESSURE: 125 MMHG | DIASTOLIC BLOOD PRESSURE: 84 MMHG

## 2025-08-07 DIAGNOSIS — M17.0 BILATERAL PRIMARY OSTEOARTHRITIS OF KNEE: ICD-10-CM

## 2025-08-07 DIAGNOSIS — I10 ESSENTIAL (PRIMARY) HYPERTENSION: ICD-10-CM

## 2025-08-07 DIAGNOSIS — I65.23 OCCLUSION AND STENOSIS OF BILATERAL CAROTID ARTERIES: ICD-10-CM

## 2025-08-07 DIAGNOSIS — M24.812 OTHER SPECIFIC JOINT DERANGEMENTS OF LEFT SHOULDER, NOT ELSEWHERE CLASSIFIED: ICD-10-CM

## 2025-08-07 DIAGNOSIS — E78.5 HYPERLIPIDEMIA, UNSPECIFIED: ICD-10-CM

## 2025-08-07 DIAGNOSIS — M23.92 UNSPECIFIED INTERNAL DERANGEMENT OF LEFT KNEE: ICD-10-CM

## 2025-08-07 DIAGNOSIS — M23.91 UNSPECIFIED INTERNAL DERANGEMENT OF RIGHT KNEE: ICD-10-CM

## 2025-08-07 DIAGNOSIS — I35.0 NONRHEUMATIC AORTIC (VALVE) STENOSIS: ICD-10-CM

## 2025-08-07 PROCEDURE — 99214 OFFICE O/P EST MOD 30 MIN: CPT

## 2025-08-07 PROCEDURE — 93000 ELECTROCARDIOGRAM COMPLETE: CPT

## 2025-08-07 PROCEDURE — G2211 COMPLEX E/M VISIT ADD ON: CPT

## 2025-08-07 PROCEDURE — 73564 X-RAY EXAM KNEE 4 OR MORE: CPT | Mod: 50

## 2025-08-07 RX ORDER — DICLOFENAC SODIUM 10 MG/G
1 GEL TOPICAL 4 TIMES DAILY
Qty: 1 | Refills: 0 | Status: ACTIVE | COMMUNITY
Start: 2025-08-07 | End: 1900-01-01

## 2025-08-28 ENCOUNTER — APPOINTMENT (OUTPATIENT)
Dept: ORTHOPEDIC SURGERY | Facility: CLINIC | Age: 60
End: 2025-08-28
Payer: OTHER MISCELLANEOUS

## 2025-08-28 VITALS — WEIGHT: 143 LBS | BODY MASS INDEX: 28.83 KG/M2 | HEIGHT: 59 IN

## 2025-08-28 DIAGNOSIS — M17.0 BILATERAL PRIMARY OSTEOARTHRITIS OF KNEE: ICD-10-CM

## 2025-08-28 PROCEDURE — 99214 OFFICE O/P EST MOD 30 MIN: CPT

## 2025-09-04 ENCOUNTER — APPOINTMENT (OUTPATIENT)
Dept: PHARMACY | Facility: CLINIC | Age: 60
End: 2025-09-04
Payer: SELF-PAY

## 2025-09-04 PROCEDURE — V5299A: CUSTOM

## 2025-09-11 ENCOUNTER — APPOINTMENT (OUTPATIENT)
Dept: ORTHOPEDIC SURGERY | Facility: CLINIC | Age: 60
End: 2025-09-11

## 2025-09-17 ENCOUNTER — APPOINTMENT (OUTPATIENT)
Dept: PHARMACY | Facility: CLINIC | Age: 60
End: 2025-09-17
Payer: SELF-PAY

## 2025-09-17 PROCEDURE — V5299K: CUSTOM
